# Patient Record
Sex: FEMALE | Race: WHITE | NOT HISPANIC OR LATINO | Employment: FULL TIME | URBAN - METROPOLITAN AREA
[De-identification: names, ages, dates, MRNs, and addresses within clinical notes are randomized per-mention and may not be internally consistent; named-entity substitution may affect disease eponyms.]

---

## 2023-05-19 ENCOUNTER — APPOINTMENT (OUTPATIENT)
Dept: LAB | Facility: LAB | Age: 33
End: 2023-05-19

## 2023-11-09 PROBLEM — H53.009 AMBLYOPIA: Status: ACTIVE | Noted: 2021-07-07

## 2023-11-09 PROBLEM — N94.6 DYSMENORRHEA: Status: ACTIVE | Noted: 2021-07-07

## 2023-11-09 PROBLEM — N94.3 PMS (PREMENSTRUAL SYNDROME): Status: ACTIVE | Noted: 2021-07-07

## 2023-11-09 PROBLEM — N60.19 FIBROCYSTIC BREAST: Status: ACTIVE | Noted: 2021-07-07

## 2023-11-09 RX ORDER — ECONAZOLE NITRATE 10 MG/G
1 CREAM TOPICAL DAILY
COMMUNITY
Start: 2015-01-19 | End: 2024-01-05 | Stop reason: ALTCHOICE

## 2023-11-09 RX ORDER — NORETHINDRONE ACETATE AND ETHINYL ESTRADIOL 1MG-20(21)
1 KIT ORAL DAILY
COMMUNITY
Start: 2020-10-22 | End: 2023-12-05

## 2023-11-09 RX ORDER — HYDROXYZINE HYDROCHLORIDE 25 MG/1
TABLET, FILM COATED ORAL
COMMUNITY
Start: 2019-05-11 | End: 2024-01-05 | Stop reason: ALTCHOICE

## 2023-11-09 RX ORDER — ESCITALOPRAM OXALATE 10 MG/1
TABLET ORAL
COMMUNITY
Start: 2019-05-11 | End: 2024-01-05 | Stop reason: ALTCHOICE

## 2023-11-10 ENCOUNTER — LAB (OUTPATIENT)
Dept: LAB | Facility: LAB | Age: 33
End: 2023-11-10
Payer: COMMERCIAL

## 2023-11-10 ENCOUNTER — INITIAL PRENATAL (OUTPATIENT)
Dept: OBSTETRICS AND GYNECOLOGY | Facility: CLINIC | Age: 33
End: 2023-11-10
Payer: COMMERCIAL

## 2023-11-10 VITALS — SYSTOLIC BLOOD PRESSURE: 113 MMHG | DIASTOLIC BLOOD PRESSURE: 72 MMHG | WEIGHT: 150 LBS

## 2023-11-10 DIAGNOSIS — Z34.91 PRENATAL CARE IN FIRST TRIMESTER (HHS-HCC): ICD-10-CM

## 2023-11-10 DIAGNOSIS — O21.9 NAUSEA AND VOMITING IN PREGNANCY PRIOR TO 22 WEEKS GESTATION (HHS-HCC): ICD-10-CM

## 2023-11-10 DIAGNOSIS — Z34.91 PRENATAL CARE IN FIRST TRIMESTER (HHS-HCC): Primary | ICD-10-CM

## 2023-11-10 DIAGNOSIS — Z23 NEED FOR IMMUNIZATION AGAINST INFLUENZA: ICD-10-CM

## 2023-11-10 DIAGNOSIS — R42 DIZZINESS: ICD-10-CM

## 2023-11-10 DIAGNOSIS — J45.20 MILD INTERMITTENT ASTHMA WITHOUT COMPLICATION (HHS-HCC): ICD-10-CM

## 2023-11-10 LAB
ABO GROUP (TYPE) IN BLOOD: NORMAL
ERYTHROCYTE [DISTWIDTH] IN BLOOD BY AUTOMATED COUNT: 10.8 % (ref 11.5–14.5)
HBV SURFACE AG SERPL QL IA: NONREACTIVE
HCT VFR BLD AUTO: 40.1 % (ref 36–46)
HCV AB SER QL: NONREACTIVE
HGB BLD-MCNC: 12.9 G/DL (ref 12–16)
HIV 1+2 AB+HIV1 P24 AG SERPL QL IA: NONREACTIVE
MCH RBC QN AUTO: 31.1 PG (ref 26–34)
MCHC RBC AUTO-ENTMCNC: 32.2 G/DL (ref 32–36)
MCV RBC AUTO: 97 FL (ref 80–100)
NRBC BLD-RTO: 0 /100 WBCS (ref 0–0)
PLATELET # BLD AUTO: 237 X10*3/UL (ref 150–450)
RBC # BLD AUTO: 4.15 X10*6/UL (ref 4–5.2)
RH FACTOR (ANTIGEN D): NORMAL
RUBV IGG SERPL IA-ACNC: 2.3 IA
RUBV IGG SERPL QL IA: POSITIVE
T PALLIDUM AB SER QL: NONREACTIVE
WBC # BLD AUTO: 8.3 X10*3/UL (ref 4.4–11.3)

## 2023-11-10 PROCEDURE — 83021 HEMOGLOBIN CHROMOTOGRAPHY: CPT

## 2023-11-10 PROCEDURE — 88175 CYTOPATH C/V AUTO FLUID REDO: CPT | Mod: TC | Performed by: STUDENT IN AN ORGANIZED HEALTH CARE EDUCATION/TRAINING PROGRAM

## 2023-11-10 PROCEDURE — 86317 IMMUNOASSAY INFECTIOUS AGENT: CPT

## 2023-11-10 PROCEDURE — 86803 HEPATITIS C AB TEST: CPT

## 2023-11-10 PROCEDURE — 86780 TREPONEMA PALLIDUM: CPT

## 2023-11-10 PROCEDURE — 90662 IIV NO PRSV INCREASED AG IM: CPT | Performed by: STUDENT IN AN ORGANIZED HEALTH CARE EDUCATION/TRAINING PROGRAM

## 2023-11-10 PROCEDURE — 83020 HEMOGLOBIN ELECTROPHORESIS: CPT

## 2023-11-10 PROCEDURE — 87086 URINE CULTURE/COLONY COUNT: CPT | Performed by: STUDENT IN AN ORGANIZED HEALTH CARE EDUCATION/TRAINING PROGRAM

## 2023-11-10 PROCEDURE — 87389 HIV-1 AG W/HIV-1&-2 AB AG IA: CPT

## 2023-11-10 PROCEDURE — 87661 TRICHOMONAS VAGINALIS AMPLIF: CPT | Mod: 59 | Performed by: STUDENT IN AN ORGANIZED HEALTH CARE EDUCATION/TRAINING PROGRAM

## 2023-11-10 PROCEDURE — 87340 HEPATITIS B SURFACE AG IA: CPT

## 2023-11-10 PROCEDURE — 0500F INITIAL PRENATAL CARE VISIT: CPT | Performed by: STUDENT IN AN ORGANIZED HEALTH CARE EDUCATION/TRAINING PROGRAM

## 2023-11-10 PROCEDURE — 86900 BLOOD TYPING SEROLOGIC ABO: CPT

## 2023-11-10 PROCEDURE — 87624 HPV HI-RISK TYP POOLED RSLT: CPT | Mod: 59 | Performed by: STUDENT IN AN ORGANIZED HEALTH CARE EDUCATION/TRAINING PROGRAM

## 2023-11-10 PROCEDURE — 36415 COLL VENOUS BLD VENIPUNCTURE: CPT

## 2023-11-10 PROCEDURE — 87800 DETECT AGNT MULT DNA DIREC: CPT | Performed by: STUDENT IN AN ORGANIZED HEALTH CARE EDUCATION/TRAINING PROGRAM

## 2023-11-10 PROCEDURE — 86901 BLOOD TYPING SEROLOGIC RH(D): CPT

## 2023-11-10 PROCEDURE — 88175 CYTOPATH C/V AUTO FLUID REDO: CPT | Mod: TC,GCY | Performed by: STUDENT IN AN ORGANIZED HEALTH CARE EDUCATION/TRAINING PROGRAM

## 2023-11-10 PROCEDURE — 85027 COMPLETE CBC AUTOMATED: CPT

## 2023-11-10 RX ORDER — ALBUTEROL SULFATE 90 UG/1
2 AEROSOL, METERED RESPIRATORY (INHALATION) EVERY 6 HOURS PRN
Qty: 18 G | Refills: 11 | Status: SHIPPED | OUTPATIENT
Start: 2023-11-10 | End: 2024-02-14 | Stop reason: HOSPADM

## 2023-11-10 RX ORDER — DOXYLAMINE SUCCINATE AND PYRIDOXINE HYDROCHLORIDE, DELAYED RELEASE TABLETS 10 MG/10 MG 10; 10 MG/1; MG/1
1 TABLET, DELAYED RELEASE ORAL DAILY
Qty: 90 TABLET | Refills: 1 | Status: SHIPPED | OUTPATIENT
Start: 2023-11-10 | End: 2024-01-05 | Stop reason: ALTCHOICE

## 2023-11-10 RX ORDER — MECLIZINE HYDROCHLORIDE 25 MG/1
25 TABLET ORAL 3 TIMES DAILY PRN
Qty: 30 TABLET | Refills: 0 | Status: SHIPPED | OUTPATIENT
Start: 2023-11-10 | End: 2023-11-20

## 2023-11-10 RX ORDER — PROMETHAZINE HYDROCHLORIDE 12.5 MG/1
12.5 TABLET ORAL EVERY 4 HOURS
Qty: 30 TABLET | Refills: 1 | Status: SHIPPED | OUTPATIENT
Start: 2023-11-10 | End: 2024-01-05 | Stop reason: ALTCHOICE

## 2023-11-10 ASSESSMENT — ENCOUNTER SYMPTOMS
NEUROLOGICAL NEGATIVE: 0
RESPIRATORY NEGATIVE: 0
ALLERGIC/IMMUNOLOGIC NEGATIVE: 0
CONSTITUTIONAL NEGATIVE: 0
CARDIOVASCULAR NEGATIVE: 0
ENDOCRINE NEGATIVE: 0
HEMATOLOGIC/LYMPHATIC NEGATIVE: 0
GASTROINTESTINAL NEGATIVE: 0
PSYCHIATRIC NEGATIVE: 0
MUSCULOSKELETAL NEGATIVE: 0
EYES NEGATIVE: 0

## 2023-11-10 NOTE — PROGRESS NOTES
Subjective   Patient ID [id number]   Lavonne Key is a 33 y.o.  at 8w6d with a working estimated date of delivery of 6/15/2024, by Last Menstrual Period who presents for an initial prenatal visit. This pregnancy is planned.    Presents with  Michael today. Excited for baby.    OB History    Para Term  AB Living   1             SAB IAB Ectopic Multiple Live Births                  # Outcome Date GA Lbr Jasson/2nd Weight Sex Delivery Anes PTL Lv   1 Current                 Objective   Physical Exam  Weight: 68 kg (150 lb)  Expected Total Weight Gain: Could not be calculated   Pregravid BMI: Could not be calculated  BP: 113/72 (Pt heart rate 75)          Physical Exam  Constitutional:       General: She is not in acute distress.     Appearance: Normal appearance.   Genitourinary:      Genitourinary Comments: NEFG. Vaginal mucosa normal appearing without lesions. Cervix nulliparous without lesions. Uterus anteverted, enlarged to 8w size. Adnexa without tenderness or masses.   HENT:      Head: Normocephalic.   Eyes:      General: No scleral icterus.     Extraocular Movements: Extraocular movements intact.   Cardiovascular:      Rate and Rhythm: Normal rate and regular rhythm.   Pulmonary:      Effort: Pulmonary effort is normal. No respiratory distress.   Abdominal:      Palpations: Abdomen is soft.   Musculoskeletal:         General: Normal range of motion.   Neurological:      General: No focal deficit present.      Mental Status: She is alert and oriented to person, place, and time.   Skin:     General: Skin is warm and dry.   Psychiatric:         Mood and Affect: Mood normal.         Behavior: Behavior normal.         Thought Content: Thought content normal.         Judgment: Judgment normal.   Vitals reviewed.         Assessment/Plan   Problem List Items Addressed This Visit             ICD-10-CM       Pregnancy    Prenatal care in first trimester - Primary Z34.91     - Labs: Ordered today: CBC,  T&S, RPR, HCV, HBsAg, HIV, GC/CT, Pap smear  - Genetic Screening: options discussed and patient desires to proceed with cffDNA. Kit provided toay.  - Ultrasound Surveillance: Viability not yet confirmed. NT scheduled today. Anatomy ultrasound at 16-20 weeks.   - Substance Use: denies  - Mood Screen: denies.  - IPV Screen: denies.    - Weight Gain: There is no height or weight on file to calculate BMI. Total weight gain of 25-35 recommended. Exercise recommendations reviewed.   - Dental Care: No current complaints. Encouraged regular dental care.  - Nutrition: Prenatal vitamin Rx provided. Encouraged consumption of low-mercury fish, avoidance of raw/undercooked fish. Encouraged avoidance of un-heated luncheon meats, hot dogs, unpasteurized cheeses and milks, kaur or meat spreads, smoked seafood, raw/undercooked meats and eggs, unwashed fruits and vegetables.            Relevant Medications    prenatal vitamin, iron-folic, (prenatal vit no.130-iron-folic) 27 mg iron-800 mcg folic acid tablet    Other Relevant Orders    US MAC OB imaging order    ABO/Rh    CBC Anemia Panel With Reflex,Pregnancy    HEMOGLOBIN IDENTIFICATION WITH PATH REVIEW    Hepatitis B surface antigen    Hepatitis C antibody    Rubella Antibody, IgG    Syphilis Screen with Reflex    HIV 1/2 Antigen/Antibody Screen with Reflex to Confirmation    THINPREP PAP TEST    [facility] Prenatal Screen    Nausea and vomiting in pregnancy prior to 22 weeks gestation O21.9     Currently symptomatic. Discussed B6/doxylamine for prevention. Encouraged patient to notify providers if symptoms become bothersome.         Relevant Medications    doxylamine-pyridoxine, vit B6, 10-10 mg tablet,delayed release (DR/EC)    promethazine (Phenergan) 12.5 mg tablet       Other    Dizziness R42     Discussed hydration, electrolytes, compression socks, checking BP  Rx for meclizine PRN          Relevant Medications    meclizine (Antivert) 25 mg tablet    Mild intermittent  asthma without complication J45.20     PRN albuterol ordered         Relevant Medications    albuterol 90 mcg/actuation inhaler    Need for immunization against influenza Z23     Flu given today            Follow up in 4 weeks for return OB visit.

## 2023-11-10 NOTE — ASSESSMENT & PLAN NOTE
- Labs: Ordered today: CBC, T&S, RPR, HCV, HBsAg, HIV, GC/CT, Pap smear  - Genetic Screening: options discussed and patient desires to proceed with cffDNA. Kit provided toay.  - Ultrasound Surveillance: Viability not yet confirmed. NT scheduled today. Anatomy ultrasound at 16-20 weeks.   - Substance Use: denies  - Mood Screen: denies.  - IPV Screen: denies.    - Weight Gain: There is no height or weight on file to calculate BMI. Total weight gain of 25-35 recommended. Exercise recommendations reviewed.   - Dental Care: No current complaints. Encouraged regular dental care.  - Nutrition: Prenatal vitamin Rx provided. Encouraged consumption of low-mercury fish, avoidance of raw/undercooked fish. Encouraged avoidance of un-heated luncheon meats, hot dogs, unpasteurized cheeses and milks, kaur or meat spreads, smoked seafood, raw/undercooked meats and eggs, unwashed fruits and vegetables.

## 2023-11-10 NOTE — ASSESSMENT & PLAN NOTE
Currently symptomatic. Discussed B6/doxylamine for prevention. Encouraged patient to notify providers if symptoms become bothersome.

## 2023-11-11 LAB — REFLEX ADDED, ANEMIA PANEL: NORMAL

## 2023-11-12 LAB — BACTERIA UR CULT: NORMAL

## 2023-11-14 LAB
C TRACH RRNA SPEC QL NAA+PROBE: NEGATIVE
HEMOGLOBIN A2: 3 % (ref 2–3.5)
HEMOGLOBIN A: 96.6 % (ref 95.8–98)
HEMOGLOBIN F: 0.4 % (ref 0–2)
HEMOGLOBIN IDENTIFICATION INTERPRETATION: NORMAL
N GONORRHOEA DNA SPEC QL PROBE+SIG AMP: NEGATIVE
PATH REVIEW-HGB IDENTIFICATION: NORMAL
T VAGINALIS RRNA SPEC QL NAA+PROBE: NEGATIVE

## 2023-12-05 ENCOUNTER — ANCILLARY PROCEDURE (OUTPATIENT)
Dept: RADIOLOGY | Facility: CLINIC | Age: 33
End: 2023-12-05
Payer: COMMERCIAL

## 2023-12-05 ENCOUNTER — ROUTINE PRENATAL (OUTPATIENT)
Dept: OBSTETRICS AND GYNECOLOGY | Facility: CLINIC | Age: 33
End: 2023-12-05
Payer: COMMERCIAL

## 2023-12-05 VITALS — WEIGHT: 152.3 LBS | DIASTOLIC BLOOD PRESSURE: 57 MMHG | SYSTOLIC BLOOD PRESSURE: 112 MMHG

## 2023-12-05 DIAGNOSIS — Z34.91 PRENATAL CARE IN FIRST TRIMESTER (HHS-HCC): ICD-10-CM

## 2023-12-05 DIAGNOSIS — O21.9 NAUSEA AND VOMITING IN PREGNANCY PRIOR TO 22 WEEKS GESTATION (HHS-HCC): ICD-10-CM

## 2023-12-05 PROCEDURE — 0501F PRENATAL FLOW SHEET: CPT

## 2023-12-05 PROCEDURE — 76813 OB US NUCHAL MEAS 1 GEST: CPT | Performed by: OBSTETRICS & GYNECOLOGY

## 2023-12-05 PROCEDURE — 76813 OB US NUCHAL MEAS 1 GEST: CPT

## 2023-12-05 ASSESSMENT — ENCOUNTER SYMPTOMS
ENDOCRINE NEGATIVE: 0
CARDIOVASCULAR NEGATIVE: 0
MUSCULOSKELETAL NEGATIVE: 0
PSYCHIATRIC NEGATIVE: 0
NEUROLOGICAL NEGATIVE: 0
HEMATOLOGIC/LYMPHATIC NEGATIVE: 0
RESPIRATORY NEGATIVE: 0
GASTROINTESTINAL NEGATIVE: 0
EYES NEGATIVE: 0
CONSTITUTIONAL NEGATIVE: 0
ALLERGIC/IMMUNOLOGIC NEGATIVE: 0

## 2023-12-05 NOTE — PROGRESS NOTES
Ob Follow-up  23     SUBJECTIVE      HPI: Lavonne Key is a 33 y.o.  at 12w3d here for RPNV.  She has no cramping no bleeding.  Patient reports some dizziness when she stands up. Having headaches for the past week. Some light sensitivity. Has not tried anything    OBJECTIVE  Visit Vitals  /57   Wt 69.1 kg (152 lb 4.8 oz)   LMP 2023   OB Status Pregnant   Smoking Status Former            ASSESSMENT & PLAN    Lavonne Key is a 33 y.o.  at 12w3d here for the following concerns we addressed today:    Problem List Items Addressed This Visit          Pregnancy    Nausea and vomiting in pregnancy prior to 22 weeks gestation    Current Assessment & Plan     Improving.         Prenatal care in first trimester    Overview     [] Initial BMI:   [x] Dating: LMP 23  [x] Prenatal Labs: ordered  [] Genetic Screening:  cffDNA, kit given; NT done   [x] Baby ASA: not indicated  [] Anatomy US:  [] 1hr GCT at 24-28wks:  [] Tdap (27-36wks):  [x] Flu Shot: 11/10/23  [] COVID vaccine: declined  [] Rhogam (if Rh neg):   [] GBS at 36 wks:  [x] Feeding: breast  [] Postpartum Birth control method: undecided  [] 39 weeks discussion of IOL vs. Expectant management:  [x] Mode of delivery:  anticipate          Current Assessment & Plan     NT done today - wnl. Up to date.  Pt has not completed cfDNA yet but has req and kit at home - will complete when she is able    Interested in education only centering - info sent and will be contacted accordingly    Discussed tylenol for headaches and is safe in pregnancy as well as adequate hydration.               No orders of the defined types were placed in this encounter.       RTC in 4 weeks  Pt seen and discussed with Dr. Dixon Morales MD

## 2023-12-05 NOTE — LETTER
12/5/2023    To Whom It May Concern:    Lavonne Key is being followed for her pregnancy at [facility].  Estimated Date of Delivery: 6/15/24    Sincerely,        Mariluz Morales MD  [facility]

## 2023-12-05 NOTE — ASSESSMENT & PLAN NOTE
NT done today - wnl. Up to date.  Pt has not completed cfDNA yet but has req and kit at home - will complete when she is able    Interested in education only centering - info sent and will be contacted accordingly    Discussed tylenol for headaches and is safe in pregnancy as well as adequate hydration.

## 2023-12-06 LAB
CYTOLOGY CMNT CVX/VAG CYTO-IMP: NORMAL
HPV HR 12 DNA GENITAL QL NAA+PROBE: NEGATIVE
HPV HR GENOTYPES PNL CVX NAA+PROBE: NEGATIVE
HPV16 DNA SPEC QL NAA+PROBE: NEGATIVE
HPV18 DNA SPEC QL NAA+PROBE: NEGATIVE
LAB AP HPV GENOTYPE QUESTION: YES
LAB AP HPV HR: NORMAL
LAB AP PAP ADDITIONAL TESTS: NORMAL
LABORATORY COMMENT REPORT: NORMAL
PATH REPORT.TOTAL CANCER: NORMAL

## 2023-12-14 ENCOUNTER — DOCUMENTATION (OUTPATIENT)
Dept: OPERATING ROOM | Facility: HOSPITAL | Age: 33
End: 2023-12-14
Payer: COMMERCIAL

## 2023-12-14 NOTE — PROGRESS NOTES
Pt called answering service. She reports that she gets staph infections in her nose often and she was wanting to know if Mupirocin ointment she has used previously. Reassured Mupirocin is a pregnancy category B drug.

## 2024-01-05 ENCOUNTER — ROUTINE PRENATAL (OUTPATIENT)
Dept: OBSTETRICS AND GYNECOLOGY | Facility: CLINIC | Age: 34
End: 2024-01-05
Payer: COMMERCIAL

## 2024-01-05 ENCOUNTER — LAB (OUTPATIENT)
Dept: LAB | Facility: LAB | Age: 34
End: 2024-01-05
Payer: COMMERCIAL

## 2024-01-05 VITALS — SYSTOLIC BLOOD PRESSURE: 112 MMHG | WEIGHT: 157 LBS | DIASTOLIC BLOOD PRESSURE: 69 MMHG

## 2024-01-05 DIAGNOSIS — Z34.91 PRENATAL CARE IN FIRST TRIMESTER (HHS-HCC): Primary | ICD-10-CM

## 2024-01-05 DIAGNOSIS — Z34.91 PRENATAL CARE IN FIRST TRIMESTER (HHS-HCC): ICD-10-CM

## 2024-01-05 PROCEDURE — 0501F PRENATAL FLOW SHEET: CPT

## 2024-01-05 PROCEDURE — 36415 COLL VENOUS BLD VENIPUNCTURE: CPT

## 2024-01-05 RX ORDER — MUPIROCIN CALCIUM 20 MG/G
CREAM TOPICAL 3 TIMES DAILY
COMMUNITY
End: 2024-03-05 | Stop reason: SDUPTHER

## 2024-01-05 NOTE — PROGRESS NOTES
"Subjective   Patient ID [id number]   Lavonne Key is a 33 y.o.  at 16w6d with a working estimated date of delivery of 6/15/2024, by Last Menstrual Period who presents for a routine prenatal visit. She denies vaginal bleeding, leakage of fluid, decreased fetal movements, or contractions.    Pt feels 8/10 intermittent RLQ pain after sneezing  or pressure is applied to abodmen, that spontaneously resolves . She also notes diffuse abdomen cramping after working out and at night . Pt Deadlifts 5 days a week. Denies VB, LOF.     Separately, patient previously endorses feeling dizzy. Dizziness has improved since she takes more frequent breaks at work and doesn't stand for prolonged periods of time     Objective   Physical Exam  Weight: 71.2 kg (157 lb)  Expected Total Weight Gain: Could not be calculated   Pregravid BMI: Could not be calculated  BP: 112/69 (HR 69)  Fetal Heart Rate: 150      Prenatal Labs  Urine dip:  No results found for: \"KETONESU\", \"GLUCOSEUR\", \"LEUKOCYTESUR\"    Lab Results   Component Value Date    HGB 12.9 11/10/2023    HCT 40.1 11/10/2023    ABO O 11/10/2023    HEPBSAG Nonreactive 11/10/2023     No results found for: \"PAPPA\", \"AFP\", \"HCG\", \"ESTRIOL\", \"INHBA\"  No results found for: \"GLUF\", \"GLUT1\", \"ULQBRFF3FR\", \"PQCNGMU3LV\"    Imaging  The most recent ultrasound was performed on The most recent ultrasound study is not finalized with a study GA of The most recent ultrasound study is not finalized and EFW of The most recent ultrasound study is not finalized.  The most recent ultrasound study is not finalized  The most recent ultrasound study is not finalized    Assessment/Plan   .  Problem List Items Addressed This Visit       Prenatal care in first trimester - Primary    Overview     [] Initial BMI:   [x] Dating: LMP 23  [x] Prenatal Labs: ordered  [] Genetic Screening:  cffDNA, kit given; NT done   [x] Baby ASA: not indicated  [] Anatomy US: ordered   [] 1hr GCT at 24-28wks:  [] Tdap " (27-36wks):  [x] Flu Shot: 11/10/23  [] COVID vaccine: declined  [] Rhogam (if Rh neg):   [] GBS at 36 wks:  [x] Feeding: breast  [] Postpartum Birth control method: undecided  [] 39 weeks discussion of IOL vs. Expectant management:  [x] Mode of delivery:  anticipate          Current Assessment & Plan     -patient provided a new cfDNA box today   - MSK pain in pregnancy, recommend continue exercise that is within her normal range and tolerance. PTL precautions were provided   - Dizziness, likely in s/o prolonged standing. Recommend compression socks, frequent breaks and adequate hydration          Relevant Orders    [facility] Prenatal Screen     Follow up in 4 weeks for a routine prenatal visit.    Seen and d/w Dr. Winchester,  Georgie Geiger MD , PGY-2

## 2024-01-06 NOTE — PROGRESS NOTES
I saw and evaluated the patient. I personally obtained the key and critical portions of the history and physical exam or was physically present for key and critical portions performed by the resident/fellow. I reviewed the resident/fellow's documentation and discussed the patient with the resident/fellow. I agree with the resident/fellow's medical decision making as documented in the note.    Anamika Winchester MD

## 2024-01-06 NOTE — ASSESSMENT & PLAN NOTE
-patient provided a new cfDNA box today   - MSK pain in pregnancy, recommend continue exercise that is within her normal range and tolerance. PTL precautions were provided   - Dizziness, likely in s/o prolonged standing. Recommend compression socks, frequent breaks and adequate hydration

## 2024-01-24 ENCOUNTER — HOSPITAL ENCOUNTER (OUTPATIENT)
Dept: RADIOLOGY | Facility: HOSPITAL | Age: 34
Discharge: HOME | End: 2024-01-24
Payer: COMMERCIAL

## 2024-01-24 DIAGNOSIS — Z34.90 PREGNANT (HHS-HCC): ICD-10-CM

## 2024-01-24 PROCEDURE — 76811 OB US DETAILED SNGL FETUS: CPT

## 2024-01-24 PROCEDURE — 76805 OB US >/= 14 WKS SNGL FETUS: CPT | Performed by: OBSTETRICS & GYNECOLOGY

## 2024-01-30 ENCOUNTER — ROUTINE PRENATAL (OUTPATIENT)
Dept: OBSTETRICS AND GYNECOLOGY | Facility: HOSPITAL | Age: 34
End: 2024-01-30
Payer: COMMERCIAL

## 2024-01-30 DIAGNOSIS — Z34.92 PRENATAL CARE IN SECOND TRIMESTER (HHS-HCC): Primary | ICD-10-CM

## 2024-01-30 DIAGNOSIS — Z3A.20 20 WEEKS GESTATION OF PREGNANCY (HHS-HCC): ICD-10-CM

## 2024-01-30 PROCEDURE — 0501F PRENATAL FLOW SHEET: CPT | Performed by: STUDENT IN AN ORGANIZED HEALTH CARE EDUCATION/TRAINING PROGRAM

## 2024-01-30 ASSESSMENT — ENCOUNTER SYMPTOMS
DEPRESSION: 0
LOSS OF SENSATION IN FEET: 0
OCCASIONAL FEELINGS OF UNSTEADINESS: 0

## 2024-01-30 NOTE — PROGRESS NOTES
Subjective   Patient ID [id number]   Lavonne Booth is a 33 y.o.  at 20w3d with a working estimated date of delivery of 6/15/2024, by Last Menstrual Period who presents for a routine prenatal visit. She denies vaginal bleeding, leakage of fluid, decreased fetal movements, or contractions.    Her pregnancy is complicated by:  Asthma, mild intermittent  Nausea and vomiting, resolved    Objective   Physical Exam  Weight: 73 kg (161 lb)  Expected Total Weight Gain: 11.5 kg (25 lb)-16 kg (35 lb)   Pregravid BMI: 23.49  BP: 120/71  Fetal Heart Rate: 130 Fundal Height (cm): 19 cm    US OB detail fetal anatomy 2024    Narrative  Interpreted by: Roque Gamble  Indication  ========  Fetal Abnormality Suspected  History  ======  General History  Smoking: no  Height 170 cm  Height (ft) 5 ft  Height (in) 7 in  Previous Outcomes   1  Para 0  Pregnancy  =========  Resendez pregnancy. Number of fetuses: 1  Dating  ======  LMP on: 2023  Cycle: irregular cycle  GA by LMP 19 w + 4 d  MICHEL by LMP: 6/15/2024  Conception: LMP  Ultrasound examination on: 2024  GA by U/S based upon: AC, BPD, Femur, HC  GA by U/S 19 w + 3 d  MICHEL by U/S: 2024  Assigned: based on ultrasound (CRL), selected on 2023  Assigned GA 19 w + 3 d  Assigned MICHEL: 2024  Fetal Growth Overview  =================  Exam date        GA              BPD (mm)          HC (mm)              AC (mm)               FL (mm)          HL (mm)             EFW (g)  2024        19w 3d        45.6     66%        166.9    39%        138.4     39%        30    37%        28.5     24%        284    36%  Impression  =========  rr cfDNA  - Fetal biometry is consistent with the stated gestational age  - Normal anatomic survey  - Normal appearing adnexa    A normal ultrasound exam cannot exclude all structural or functional defects    Thank you for allowing us to participate in the care of your patient  Follow-up  ========  If clinically  indicated    Assessment/Plan   Problem List Items Addressed This Visit       Prenatal care in second trimester - Primary    Overview     [x] Initial BMI: 23  [x] Dating: LMP 23  [x] Prenatal Labs: ordered  [x] Genetic Screening:  risk-reducing cfDNA; NT done   [x] Baby ASA: not indicated  [x] Anatomy US: unremarkable  [] 1hr GCT at 24-28wks:  [] Tdap (27-36wks):  [x] Flu Shot: 11/10/23  [] COVID vaccine: declined  [] GBS at 36 wks:  [x] Feeding: breast  [] Postpartum Birth control method: undecided  [] 39 weeks discussion of IOL vs. Expectant management:  [x] Mode of delivery:  anticipate           Other Visit Diagnoses       20 weeks gestation of pregnancy            Reviewed next labs to be obtained 24-28 wga including OGT.  Follow up in 4 weeks for a routine prenatal visit.    Elli Villar MD

## 2024-01-31 VITALS
BODY MASS INDEX: 25.27 KG/M2 | HEIGHT: 67 IN | DIASTOLIC BLOOD PRESSURE: 71 MMHG | SYSTOLIC BLOOD PRESSURE: 120 MMHG | WEIGHT: 161 LBS

## 2024-01-31 PROBLEM — Z34.92 PRENATAL CARE IN SECOND TRIMESTER (HHS-HCC): Status: ACTIVE | Noted: 2023-11-10

## 2024-02-14 ENCOUNTER — HOSPITAL ENCOUNTER (OUTPATIENT)
Facility: HOSPITAL | Age: 34
Discharge: HOME | End: 2024-02-14
Attending: STUDENT IN AN ORGANIZED HEALTH CARE EDUCATION/TRAINING PROGRAM | Admitting: OBSTETRICS & GYNECOLOGY
Payer: COMMERCIAL

## 2024-02-14 ENCOUNTER — HOSPITAL ENCOUNTER (OUTPATIENT)
Facility: HOSPITAL | Age: 34
End: 2024-02-14
Attending: OBSTETRICS & GYNECOLOGY | Admitting: OBSTETRICS & GYNECOLOGY
Payer: COMMERCIAL

## 2024-02-14 ENCOUNTER — TELEPHONE (OUTPATIENT)
Dept: OBSTETRICS AND GYNECOLOGY | Facility: HOSPITAL | Age: 34
End: 2024-02-14
Payer: COMMERCIAL

## 2024-02-14 VITALS
TEMPERATURE: 97.2 F | HEART RATE: 75 BPM | DIASTOLIC BLOOD PRESSURE: 61 MMHG | BODY MASS INDEX: 26.02 KG/M2 | WEIGHT: 165.79 LBS | RESPIRATION RATE: 16 BRPM | HEIGHT: 67 IN | SYSTOLIC BLOOD PRESSURE: 113 MMHG

## 2024-02-14 DIAGNOSIS — Z3A.22 22 WEEKS GESTATION OF PREGNANCY (HHS-HCC): Primary | ICD-10-CM

## 2024-02-14 DIAGNOSIS — M94.0 COSTOCHONDRITIS: ICD-10-CM

## 2024-02-14 PROCEDURE — 99214 OFFICE O/P EST MOD 30 MIN: CPT | Mod: 25,27

## 2024-02-14 PROCEDURE — 99212 OFFICE O/P EST SF 10 MIN: CPT

## 2024-02-14 RX ORDER — CYCLOBENZAPRINE HCL 10 MG
10 TABLET ORAL 3 TIMES DAILY PRN
Qty: 20 TABLET | Refills: 0 | Status: SHIPPED | OUTPATIENT
Start: 2024-02-14 | End: 2024-04-23 | Stop reason: SDUPTHER

## 2024-02-14 SDOH — SOCIAL STABILITY: SOCIAL INSECURITY: ARE YOU OR HAVE YOU BEEN THREATENED OR ABUSED PHYSICALLY, EMOTIONALLY, OR SEXUALLY BY ANYONE?: NO

## 2024-02-14 SDOH — HEALTH STABILITY: MENTAL HEALTH: SUICIDAL BEHAVIOR (LIFETIME): NO

## 2024-02-14 SDOH — SOCIAL STABILITY: SOCIAL INSECURITY: VERBAL ABUSE: DENIES

## 2024-02-14 SDOH — HEALTH STABILITY: MENTAL HEALTH: NON-SPECIFIC ACTIVE SUICIDAL THOUGHTS (PAST 1 MONTH): NO

## 2024-02-14 SDOH — SOCIAL STABILITY: SOCIAL INSECURITY: ARE THERE ANY APPARENT SIGNS OF INJURIES/BEHAVIORS THAT COULD BE RELATED TO ABUSE/NEGLECT?: NO

## 2024-02-14 SDOH — HEALTH STABILITY: MENTAL HEALTH: WISH TO BE DEAD (PAST 1 MONTH): NO

## 2024-02-14 SDOH — SOCIAL STABILITY: SOCIAL INSECURITY: PHYSICAL ABUSE: DENIES

## 2024-02-14 SDOH — HEALTH STABILITY: MENTAL HEALTH: WERE YOU ABLE TO COMPLETE ALL THE BEHAVIORAL HEALTH SCREENINGS?: YES

## 2024-02-14 SDOH — SOCIAL STABILITY: SOCIAL INSECURITY: ABUSE SCREEN: ADULT

## 2024-02-14 SDOH — SOCIAL STABILITY: SOCIAL INSECURITY: DOES ANYONE TRY TO KEEP YOU FROM HAVING/CONTACTING OTHER FRIENDS OR DOING THINGS OUTSIDE YOUR HOME?: NO

## 2024-02-14 SDOH — HEALTH STABILITY: MENTAL HEALTH: HAVE YOU USED ANY PRESCRIPTION DRUGS OTHER THAN PRESCRIBED IN THE PAST 12 MONTHS?: NO

## 2024-02-14 SDOH — HEALTH STABILITY: MENTAL HEALTH: HAVE YOU USED ANY SUBSTANCES (CANABIS, COCAINE, HEROIN, HALLUCINOGENS, INHALANTS, ETC.) IN THE PAST 12 MONTHS?: NO

## 2024-02-14 SDOH — SOCIAL STABILITY: SOCIAL INSECURITY: HAS ANYONE EVER THREATENED TO HURT YOUR FAMILY OR YOUR PETS?: NO

## 2024-02-14 SDOH — SOCIAL STABILITY: SOCIAL INSECURITY: HAVE YOU HAD THOUGHTS OF HARMING ANYONE ELSE?: NO

## 2024-02-14 SDOH — SOCIAL STABILITY: SOCIAL INSECURITY: DO YOU FEEL ANYONE HAS EXPLOITED OR TAKEN ADVANTAGE OF YOU FINANCIALLY OR OF YOUR PERSONAL PROPERTY?: NO

## 2024-02-14 SDOH — ECONOMIC STABILITY: HOUSING INSECURITY: DO YOU FEEL UNSAFE GOING BACK TO THE PLACE WHERE YOU ARE LIVING?: NO

## 2024-02-14 ASSESSMENT — LIFESTYLE VARIABLES
SKIP TO QUESTIONS 9-10: 1
AUDIT-C TOTAL SCORE: 0
AUDIT-C TOTAL SCORE: 0
HOW OFTEN DO YOU HAVE A DRINK CONTAINING ALCOHOL: NEVER
HOW OFTEN DO YOU HAVE 6 OR MORE DRINKS ON ONE OCCASION: NEVER
HOW MANY STANDARD DRINKS CONTAINING ALCOHOL DO YOU HAVE ON A TYPICAL DAY: PATIENT DOES NOT DRINK

## 2024-02-14 ASSESSMENT — PAIN SCALES - GENERAL: PAINLEVEL_OUTOF10: 4

## 2024-02-14 ASSESSMENT — PATIENT HEALTH QUESTIONNAIRE - PHQ9
2. FEELING DOWN, DEPRESSED OR HOPELESS: NOT AT ALL
1. LITTLE INTEREST OR PLEASURE IN DOING THINGS: NOT AT ALL
SUM OF ALL RESPONSES TO PHQ9 QUESTIONS 1 & 2: 0

## 2024-02-14 NOTE — H&P
Obstetrical Admission History and Physical     Lavonne Booth is a 33 y.o.  at 22.4 wga with LUQ numbness and abdominal pain.    Chief Complaint: Numbness    Assessment/Plan      Costochondritis  - SVE 0/0/-3  - PE negative for abdominal pain with palpation  - Likely MSK pain 2/2 costochondritis, low c/f PTL or splenomegaly  - Offered tylenol and flexeril, patient declines as she is going back to work  - Flexeril sent to pharmacy  - Return precautions reviewed, patient verbalizes understanding     IUP at 22.4 wga  -  by doppler  - Decreased fetal movement  - Continue routine prenatal care  - Next appt 3/5     Maternal Well-being  - Vital signs stable and WNL  - All questions and concerns addressed     Dispo  - patient appropriate for d/c home, agrees with plan  - f/u at next scheduled OB appt or to triage sooner as needed     Discussed plan and reviewed tracing with COSTA Guerrero-CNP      Active Problems:    Costochondritis    22 weeks gestation of pregnancy      Pregnancy Problems (from 11/10/23 to present)       Problem Noted Resolved    22 weeks gestation of pregnancy 2024 by COSTA Oliveira-CNP No    Priority:  Medium      Prenatal care in second trimester 11/10/2023 by Jose Elias Mendoza MD No    Priority:  Medium      Overview Addendum 2024 11:22 PM by Elli Villar MD     [x] Initial BMI: 23  [x] Dating: LMP 23  [x] Prenatal Labs: ordered  [x] Genetic Screening:  risk-reducing cfDNA; NT done   [x] Baby ASA: not indicated  [x] Anatomy US: unremarkable  [] 1hr GCT at 24-28wks:  [] Tdap (27-36wks):  [x] Flu Shot: 11/10/23  [] COVID vaccine: declined  [] GBS at 36 wks:  [x] Feeding: breast  [] Postpartum Birth control method: undecided  [] 39 weeks discussion of IOL vs. Expectant management:  [x] Mode of delivery:  anticipate          Nausea and vomiting in pregnancy prior to 22 weeks gestation 11/10/2023 by Jose Elias Mendoza MD No    Priority:   Medium            Subjective   Lavonne is here with LUQ pain. Decreased fetal movement. Denies vaginal bleeding., Denies contractions., Denies leaking of fluid.      Patient reports numbness in LUQ x 1 week, now with constant sharp pain x 2 days. She has trouble sleeping because of it. Denies symptoms of illness: no sore throat, malaise, fever/chills. Reports normal bowel movements, no constipation.     Obstetrical History   OB History    Para Term  AB Living   1             SAB IAB Ectopic Multiple Live Births                  # Outcome Date GA Lbr Jasson/2nd Weight Sex Delivery Anes PTL Lv   1 Current                Past Medical History  No past medical history on file.     Past Surgical History   Past Surgical History:   Procedure Laterality Date    EYE SURGERY         Social History  Social History     Tobacco Use    Smoking status: Former    Smokeless tobacco: Never    Tobacco comments:     Pt used vape before pregnancy    Substance Use Topics    Alcohol use: Not Currently     Comment: occasional     Substance and Sexual Activity   Drug Use Never       Allergies  Azithromycin     Medications  No medications prior to admission.       Objective    Last Vitals  Temp Pulse Resp BP MAP O2 Sat   36.2 °C (97.2 °F) (Simultaneous filing. User may not have seen previous data.) 75 16 113/61         Physical Examination  FHR is 142  by doppler  CERVIX: Closed cm dilated, 0 % effaced, -3 station; MEMBRANES are  intact  General: Examination reveals a well developed, well nourished, female, in no acute distress. She is alert and cooperative.  Lungs: symmetrical, non-labored breathing.  Cardiac: warm, well-perfused.  Abdomen: soft, non-tender, gravid.  Extremities: no redness or tenderness in the calves or thighs.  Neurological: alert, oriented, normal speech, no focal findings or movement disorder noted.     Lab Review  Labs in chart were reviewed.

## 2024-02-14 NOTE — TELEPHONE ENCOUNTER
Patient called office to report abdominal pain  Patient identified by name and   Patient is 22.4 wga  Patient states for 1 week she has been experiencing numbness/tingling and pain on her left upper abdomen  Patient states that the pain used to resolve with position changes but it is not going away now and is a sharp stabbing pain  She also notes that the area seems swollen and at times she feels nauseous due to the pain  Patient denies vaginal bleeding and loss of fluid  Patient endorses fetal movement but states it is more decreased than usual  Denies lower extremity swelling and vision changes  Endorses intermittent headaches that usually resolve on their own  Patient states she has not taken tylenol for any pain  She states she contacted the on call MD yesterday who instructed her to call office today for appointment  No appointment availability today  Instructed patient to present to [facility] L&D triage for evaluation to check on her and baby  Patient verbalized understanding  Encouraged patient to call office with any questions or concerns  Jennifer Sprague RN

## 2024-02-19 ENCOUNTER — TELEPHONE (OUTPATIENT)
Dept: OBSTETRICS AND GYNECOLOGY | Facility: HOSPITAL | Age: 34
End: 2024-02-19
Payer: COMMERCIAL

## 2024-02-19 NOTE — TELEPHONE ENCOUNTER
Called patient to follow up after triage visit on 2/14  Patient states she is feeling much better and was given Flexeril for muscle spasms and also saw a chiropractor  Patient scheduled for rpn on 3/5 with Dr. Villar  Encouraged patient to call office with any questions or concerns  Jennifer Sprague RN

## 2024-03-05 ENCOUNTER — ROUTINE PRENATAL (OUTPATIENT)
Dept: OBSTETRICS AND GYNECOLOGY | Facility: HOSPITAL | Age: 34
End: 2024-03-05
Payer: COMMERCIAL

## 2024-03-05 VITALS — WEIGHT: 167 LBS | SYSTOLIC BLOOD PRESSURE: 116 MMHG | DIASTOLIC BLOOD PRESSURE: 75 MMHG | BODY MASS INDEX: 26.16 KG/M2

## 2024-03-05 DIAGNOSIS — Z3A.25 25 WEEKS GESTATION OF PREGNANCY (HHS-HCC): ICD-10-CM

## 2024-03-05 DIAGNOSIS — Z34.92 PRENATAL CARE IN SECOND TRIMESTER (HHS-HCC): ICD-10-CM

## 2024-03-05 DIAGNOSIS — Z22.322 NASAL COLONIZATION WITH METHICILLIN-RESISTANT STAPHYLOCOCCUS AUREUS: Primary | ICD-10-CM

## 2024-03-05 PROCEDURE — 0501F PRENATAL FLOW SHEET: CPT | Performed by: STUDENT IN AN ORGANIZED HEALTH CARE EDUCATION/TRAINING PROGRAM

## 2024-03-05 RX ORDER — MUPIROCIN CALCIUM 20 MG/G
CREAM TOPICAL 3 TIMES DAILY
Qty: 30 G | Refills: 2 | Status: SHIPPED | OUTPATIENT
Start: 2024-03-05 | End: 2025-03-05

## 2024-03-06 ENCOUNTER — HOSPITAL ENCOUNTER (OUTPATIENT)
Facility: HOSPITAL | Age: 34
Discharge: HOME | End: 2024-03-06
Attending: STUDENT IN AN ORGANIZED HEALTH CARE EDUCATION/TRAINING PROGRAM | Admitting: OBSTETRICS & GYNECOLOGY
Payer: COMMERCIAL

## 2024-03-06 VITALS
OXYGEN SATURATION: 96 % | BODY MASS INDEX: 26.35 KG/M2 | HEART RATE: 80 BPM | TEMPERATURE: 98.4 F | WEIGHT: 168.21 LBS | RESPIRATION RATE: 16 BRPM | DIASTOLIC BLOOD PRESSURE: 65 MMHG | SYSTOLIC BLOOD PRESSURE: 108 MMHG

## 2024-03-06 PROBLEM — R03.0 ELEVATED BLOOD PRESSURE READING WITHOUT DIAGNOSIS OF HYPERTENSION: Status: ACTIVE | Noted: 2024-03-06

## 2024-03-06 LAB
ALBUMIN SERPL BCP-MCNC: 3.6 G/DL (ref 3.4–5)
ALP SERPL-CCNC: 38 U/L (ref 33–110)
ALT SERPL W P-5'-P-CCNC: 9 U/L (ref 7–45)
ANION GAP SERPL CALC-SCNC: 13 MMOL/L (ref 10–20)
AST SERPL W P-5'-P-CCNC: 16 U/L (ref 9–39)
BILIRUB SERPL-MCNC: 0.3 MG/DL (ref 0–1.2)
BUN SERPL-MCNC: 24 MG/DL (ref 6–23)
CALCIUM SERPL-MCNC: 8.7 MG/DL (ref 8.6–10.6)
CHLORIDE SERPL-SCNC: 106 MMOL/L (ref 98–107)
CO2 SERPL-SCNC: 22 MMOL/L (ref 21–32)
CREAT SERPL-MCNC: 0.69 MG/DL (ref 0.5–1.05)
CREAT UR-MCNC: 44.7 MG/DL (ref 20–320)
EGFRCR SERPLBLD CKD-EPI 2021: >90 ML/MIN/1.73M*2
ERYTHROCYTE [DISTWIDTH] IN BLOOD BY AUTOMATED COUNT: 11.7 % (ref 11.5–14.5)
GLUCOSE 1H P 50 G GLC PO SERPL-MCNC: 115 MG/DL
GLUCOSE BLD MANUAL STRIP-MCNC: 82 MG/DL (ref 74–99)
GLUCOSE SERPL-MCNC: 74 MG/DL (ref 74–99)
HCT VFR BLD AUTO: 33.8 % (ref 36–46)
HGB BLD-MCNC: 11.6 G/DL (ref 12–16)
MCH RBC QN AUTO: 31.8 PG (ref 26–34)
MCHC RBC AUTO-ENTMCNC: 34.3 G/DL (ref 32–36)
MCV RBC AUTO: 93 FL (ref 80–100)
NRBC BLD-RTO: 0 /100 WBCS (ref 0–0)
PLATELET # BLD AUTO: 215 X10*3/UL (ref 150–450)
POTASSIUM SERPL-SCNC: 4.1 MMOL/L (ref 3.5–5.3)
PROT SERPL-MCNC: 6.1 G/DL (ref 6.4–8.2)
PROT UR-ACNC: <4 MG/DL (ref 5–24)
PROT/CREAT UR: ABNORMAL MG/G{CREAT}
RBC # BLD AUTO: 3.65 X10*6/UL (ref 4–5.2)
SODIUM SERPL-SCNC: 137 MMOL/L (ref 136–145)
TREPONEMA PALLIDUM IGG+IGM AB [PRESENCE] IN SERUM OR PLASMA BY IMMUNOASSAY: NONREACTIVE
WBC # BLD AUTO: 8.9 X10*3/UL (ref 4.4–11.3)

## 2024-03-06 PROCEDURE — 82570 ASSAY OF URINE CREATININE: CPT

## 2024-03-06 PROCEDURE — 36415 COLL VENOUS BLD VENIPUNCTURE: CPT

## 2024-03-06 PROCEDURE — 86780 TREPONEMA PALLIDUM: CPT

## 2024-03-06 PROCEDURE — 85027 COMPLETE CBC AUTOMATED: CPT

## 2024-03-06 PROCEDURE — 82947 ASSAY GLUCOSE BLOOD QUANT: CPT | Mod: 91

## 2024-03-06 PROCEDURE — 99213 OFFICE O/P EST LOW 20 MIN: CPT

## 2024-03-06 PROCEDURE — 84075 ASSAY ALKALINE PHOSPHATASE: CPT

## 2024-03-06 RX ORDER — LIDOCAINE HYDROCHLORIDE 10 MG/ML
0.5 INJECTION INFILTRATION; PERINEURAL ONCE AS NEEDED
Status: DISCONTINUED | OUTPATIENT
Start: 2024-03-06 | End: 2024-03-06 | Stop reason: HOSPADM

## 2024-03-06 RX ORDER — ONDANSETRON HYDROCHLORIDE 2 MG/ML
4 INJECTION, SOLUTION INTRAVENOUS EVERY 6 HOURS PRN
Status: DISCONTINUED | OUTPATIENT
Start: 2024-03-06 | End: 2024-03-06 | Stop reason: HOSPADM

## 2024-03-06 RX ORDER — LABETALOL HYDROCHLORIDE 5 MG/ML
20 INJECTION, SOLUTION INTRAVENOUS ONCE AS NEEDED
Status: DISCONTINUED | OUTPATIENT
Start: 2024-03-06 | End: 2024-03-06 | Stop reason: HOSPADM

## 2024-03-06 RX ORDER — HYDRALAZINE HYDROCHLORIDE 20 MG/ML
5 INJECTION INTRAMUSCULAR; INTRAVENOUS ONCE AS NEEDED
Status: DISCONTINUED | OUTPATIENT
Start: 2024-03-06 | End: 2024-03-06 | Stop reason: HOSPADM

## 2024-03-06 RX ORDER — NIFEDIPINE 10 MG/1
10 CAPSULE ORAL ONCE AS NEEDED
Status: DISCONTINUED | OUTPATIENT
Start: 2024-03-06 | End: 2024-03-06 | Stop reason: HOSPADM

## 2024-03-06 RX ORDER — METOCLOPRAMIDE 10 MG/1
10 TABLET ORAL ONCE
Status: DISCONTINUED | OUTPATIENT
Start: 2024-03-06 | End: 2024-03-06 | Stop reason: HOSPADM

## 2024-03-06 RX ORDER — ONDANSETRON 4 MG/1
4 TABLET, FILM COATED ORAL EVERY 6 HOURS PRN
Status: DISCONTINUED | OUTPATIENT
Start: 2024-03-06 | End: 2024-03-06 | Stop reason: HOSPADM

## 2024-03-06 SDOH — HEALTH STABILITY: MENTAL HEALTH: SUICIDAL BEHAVIOR (LIFETIME): NO

## 2024-03-06 SDOH — SOCIAL STABILITY: SOCIAL INSECURITY: ARE THERE ANY APPARENT SIGNS OF INJURIES/BEHAVIORS THAT COULD BE RELATED TO ABUSE/NEGLECT?: NO

## 2024-03-06 SDOH — SOCIAL STABILITY: SOCIAL INSECURITY: DOES ANYONE TRY TO KEEP YOU FROM HAVING/CONTACTING OTHER FRIENDS OR DOING THINGS OUTSIDE YOUR HOME?: NO

## 2024-03-06 SDOH — HEALTH STABILITY: MENTAL HEALTH: HAVE YOU USED ANY SUBSTANCES (CANABIS, COCAINE, HEROIN, HALLUCINOGENS, INHALANTS, ETC.) IN THE PAST 12 MONTHS?: NO

## 2024-03-06 SDOH — HEALTH STABILITY: MENTAL HEALTH: WERE YOU ABLE TO COMPLETE ALL THE BEHAVIORAL HEALTH SCREENINGS?: YES

## 2024-03-06 SDOH — SOCIAL STABILITY: SOCIAL INSECURITY: HAS ANYONE EVER THREATENED TO HURT YOUR FAMILY OR YOUR PETS?: NO

## 2024-03-06 SDOH — ECONOMIC STABILITY: HOUSING INSECURITY: DO YOU FEEL UNSAFE GOING BACK TO THE PLACE WHERE YOU ARE LIVING?: NO

## 2024-03-06 SDOH — SOCIAL STABILITY: SOCIAL INSECURITY: DO YOU FEEL ANYONE HAS EXPLOITED OR TAKEN ADVANTAGE OF YOU FINANCIALLY OR OF YOUR PERSONAL PROPERTY?: NO

## 2024-03-06 SDOH — SOCIAL STABILITY: SOCIAL INSECURITY: HAVE YOU HAD THOUGHTS OF HARMING ANYONE ELSE?: NO

## 2024-03-06 SDOH — HEALTH STABILITY: MENTAL HEALTH: HAVE YOU USED ANY PRESCRIPTION DRUGS OTHER THAN PRESCRIBED IN THE PAST 12 MONTHS?: NO

## 2024-03-06 SDOH — SOCIAL STABILITY: SOCIAL INSECURITY: VERBAL ABUSE: DENIES

## 2024-03-06 SDOH — SOCIAL STABILITY: SOCIAL INSECURITY: PHYSICAL ABUSE: DENIES

## 2024-03-06 SDOH — HEALTH STABILITY: MENTAL HEALTH: NON-SPECIFIC ACTIVE SUICIDAL THOUGHTS (PAST 1 MONTH): NO

## 2024-03-06 SDOH — SOCIAL STABILITY: SOCIAL INSECURITY: ABUSE SCREEN: ADULT

## 2024-03-06 SDOH — HEALTH STABILITY: MENTAL HEALTH: WISH TO BE DEAD (PAST 1 MONTH): NO

## 2024-03-06 SDOH — SOCIAL STABILITY: SOCIAL INSECURITY: ARE YOU OR HAVE YOU BEEN THREATENED OR ABUSED PHYSICALLY, EMOTIONALLY, OR SEXUALLY BY ANYONE?: NO

## 2024-03-06 ASSESSMENT — LIFESTYLE VARIABLES
AUDIT-C TOTAL SCORE: 0
HOW MANY STANDARD DRINKS CONTAINING ALCOHOL DO YOU HAVE ON A TYPICAL DAY: PATIENT DOES NOT DRINK
HOW OFTEN DO YOU HAVE A DRINK CONTAINING ALCOHOL: NEVER
AUDIT-C TOTAL SCORE: 0
HOW OFTEN DO YOU HAVE 6 OR MORE DRINKS ON ONE OCCASION: NEVER
SKIP TO QUESTIONS 9-10: 1

## 2024-03-06 ASSESSMENT — PAIN SCALES - GENERAL: PAINLEVEL: 2

## 2024-03-06 ASSESSMENT — PATIENT HEALTH QUESTIONNAIRE - PHQ9
1. LITTLE INTEREST OR PLEASURE IN DOING THINGS: NOT AT ALL
2. FEELING DOWN, DEPRESSED OR HOPELESS: NOT AT ALL
SUM OF ALL RESPONSES TO PHQ9 QUESTIONS 1 & 2: 0

## 2024-03-06 NOTE — DISCHARGE INSTRUCTIONS
Please take your blood pressure twice a day.     If your blood pressure is >140 systolic (top number) or >90 diastolic (bottom number), please call your doctor's office.   If your blood pressure is >160 systolic, or >110 diastolic, please go to Labor and Delivery.    If you have a headache that does not resolve with tylenol, vision changes, chest pain, or new onset shortness of breath, please go to Labor and Delivery.

## 2024-03-06 NOTE — H&P
"Obstetrical Admission History and Physical     Lavonne Booth is a 33 y.o.  at 25w4d by c/w 12wk US presents for elevated Bps at work.    Chief Complaint: elevated blood pressures    Assessment/Plan    Rule out hypertensive disorder of pregnancy  - 2x mild range Bps on hospital cuff at pt work, <4hrs apart.   - Blood pressures normotensive while in triage.  - 1/10 HA on arrival , resolved without medications  - POCT BG colleted due to feeling \"weak\" -> 82, sxs resolved in triage   - HELLP labs pending  - P:C too low to calculate    Fetal status  - NST AGA  - 1hr OGT and 2nd trimester labs done today in triage    Dispo: signed out to night team. Anticipate discharge with BP cuff for home monitoring if HELLP labs wnl.    Seen and discussed with Dr. Aurea Wheeler MD  PGY-2, Obstetrics and Gynecology    Night team update: HELLP labs WNL. Patient discharged.  COSTA Oliveira-CNP     Active Problems:    Elevated blood pressure reading without diagnosis of hypertension      Pregnancy Problems (from 11/10/23 to present)       Problem Noted Resolved    22 weeks gestation of pregnancy 2024 by COSTA Oliveira-CNP No    Priority:  Medium      Prenatal care in second trimester 11/10/2023 by Jose Elias Mendoza MD No    Priority:  Medium      Overview Addendum 2024 11:22 PM by Elli Villar MD     [x] Initial BMI: 23  [x] Dating: LMP 23  [x] Prenatal Labs: ordered  [x] Genetic Screening:  risk-reducing cfDNA; NT done   [x] Baby ASA: not indicated  [x] Anatomy US: unremarkable  [] 1hr GCT at 24-28wks:  [] Tdap (27-36wks):  [x] Flu Shot: 11/10/23  [] COVID vaccine: declined  [] GBS at 36 wks:  [x] Feeding: breast  [] Postpartum Birth control method: undecided  [] 39 weeks discussion of IOL vs. Expectant management:  [x] Mode of delivery:  anticipate          Nausea and vomiting in pregnancy prior to 22 weeks gestation 11/10/2023 by Jose Elias Mendoza MD No    Priority:  " "Medium            Subjective   Lavonne is here complaining of mild range Bps at home.    Patient works as physical therapist at . Was feeling generally \"off\" today, thought her blood pressure was low, however when she took it on the work cuff, it was mild range. Repeat BP check after resting was again mild range.     She currently also has a 1/10 HA, declines medications. Denies CP, SOB, or RUQ pain. She is also feeling extremely thirsty.    Pregnancy notable for:  - asthma, rare albuterol use       Obstetrical History   OB History    Para Term  AB Living   1             SAB IAB Ectopic Multiple Live Births                  # Outcome Date GA Lbr Jasson/2nd Weight Sex Delivery Anes PTL Lv   1 Current                Past Medical History  No past medical history on file.     Past Surgical History   Past Surgical History:   Procedure Laterality Date    EYE SURGERY         Social History  Social History     Tobacco Use    Smoking status: Former    Smokeless tobacco: Never    Tobacco comments:     Pt used vape before pregnancy    Substance Use Topics    Alcohol use: Not Currently     Comment: occasional     Substance and Sexual Activity   Drug Use Never       Allergies  Azithromycin     Medications  Medications Prior to Admission   Medication Sig Dispense Refill Last Dose    cyclobenzaprine (Flexeril) 10 mg tablet Take 1 tablet (10 mg) by mouth 3 times a day as needed for muscle spasms. 20 tablet 0     mupirocin (Bactroban) 2 % cream Apply topically 3 times a day. 30 g 2     prenatal vitamin, iron-folic, (prenatal vit no.130-iron-folic) 27 mg iron-800 mcg folic acid tablet Take 1 tablet by mouth once daily. 90 tablet 3        Objective    Last Vitals  Temp Pulse Resp BP MAP O2 Sat   36.9 °C (98.4 °F) 65 16 110/68   99 %     Physical Examination  General: Well appearing, alert  HEENT: normocephalic, EOMI, clear sclera  Cardio: Warm and well perfused, RRR  Resp: breathing comfortably on room air, CTAB  Abd: " Gravid, nontender  Neuro: grossly intact, no focal deficits  Extremities: full ROM, no calf tenderness  Psych: A&O x3, appropriate mood and affect    Fetal Assessment  FHT: 145/mod/AGA  Fowler: quiet    Lab Review    Lab Results   Component Value Date    [facility]  03/06/2024      Comment:      One or more analytes used in this calculation is outside of the analytical measurement range. Calculation cannot be performed.

## 2024-03-09 PROBLEM — Z3A.25 25 WEEKS GESTATION OF PREGNANCY (HHS-HCC): Status: ACTIVE | Noted: 2024-02-14

## 2024-03-09 NOTE — PROGRESS NOTES
Subjective   Patient ID [id number]   Lavonne Booth is a 33 y.o.  at 25w3d with a working estimated date of delivery of 6/15/2024, by Last Menstrual Period who presents for a routine prenatal visit. She denies vaginal bleeding, leakage of fluid, decreased fetal movements, or contractions.    Her pregnancy is complicated by:  Asthma      Objective   Physical Exam  Weight: 75.8 kg (167 lb)  Expected Total Weight Gain: 11.5 kg (25 lb)-16 kg (35 lb)   Pregravid BMI: 23.49  BP: 116/75  Fetal Heart Rate: 145 Fundal Height (cm): 25 cm         Assessment/Plan   Problem List Items Addressed This Visit       Prenatal care in second trimester    Overview     [x] Initial BMI: 23  [x] Dating: LMP 23  [x] Prenatal Labs: ordered  [x] Genetic Screening:  risk-reducing cfDNA; NT done   [x] Baby ASA: not indicated  [x] Anatomy US: unremarkable  [] 1hr GCT at 24-28wks:  [] Tdap (27-36wks):  [x] Flu Shot: 11/10/23  [] COVID vaccine: declined  [] GBS at 36 wks:  [x] Feeding: breast  [] Postpartum Birth control method: undecided  [] 39 weeks discussion of IOL vs. 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  GTT ordered, intends to draw at work tomorrow.  Follow up in 4 weeks for a routine prenatal visit.

## 2024-03-21 ENCOUNTER — HOSPITAL ENCOUNTER (OUTPATIENT)
Dept: CARDIOLOGY | Facility: HOSPITAL | Age: 34
Discharge: HOME | End: 2024-03-21
Payer: COMMERCIAL

## 2024-03-21 ENCOUNTER — HOSPITAL ENCOUNTER (OUTPATIENT)
Facility: HOSPITAL | Age: 34
End: 2024-03-21
Attending: STUDENT IN AN ORGANIZED HEALTH CARE EDUCATION/TRAINING PROGRAM | Admitting: STUDENT IN AN ORGANIZED HEALTH CARE EDUCATION/TRAINING PROGRAM
Payer: COMMERCIAL

## 2024-03-21 ENCOUNTER — TELEPHONE (OUTPATIENT)
Dept: OBSTETRICS AND GYNECOLOGY | Facility: HOSPITAL | Age: 34
End: 2024-03-21
Payer: COMMERCIAL

## 2024-03-21 ENCOUNTER — HOSPITAL ENCOUNTER (OUTPATIENT)
Facility: HOSPITAL | Age: 34
Discharge: HOME | End: 2024-03-21
Attending: STUDENT IN AN ORGANIZED HEALTH CARE EDUCATION/TRAINING PROGRAM | Admitting: STUDENT IN AN ORGANIZED HEALTH CARE EDUCATION/TRAINING PROGRAM
Payer: COMMERCIAL

## 2024-03-21 VITALS
OXYGEN SATURATION: 97 % | TEMPERATURE: 97.2 F | SYSTOLIC BLOOD PRESSURE: 119 MMHG | HEART RATE: 83 BPM | DIASTOLIC BLOOD PRESSURE: 71 MMHG

## 2024-03-21 LAB
ATRIAL RATE: 79 BPM
BILIRUBIN, POC: NEGATIVE
BLOOD URINE, POC: NEGATIVE
CLARITY, POC: CLEAR
CLUE CELLS SPEC QL WET PREP: NORMAL
COLOR, POC: YELLOW
GLUCOSE URINE, POC: NEGATIVE
KETONES, POC: NEGATIVE
LEUKOCYTE EST, POC: NEGATIVE
NITRITE, POC: NEGATIVE
P AXIS: 23 DEGREES
P OFFSET: 197 MS
P ONSET: 159 MS
PH, POC: 7
POC APPEARANCE OF BODY FLUID: CLEAR
PR INTERVAL: 134 MS
Q ONSET: 226 MS
QRS COUNT: 13 BEATS
QRS DURATION: 66 MS
QT INTERVAL: 354 MS
QTC CALCULATION(BAZETT): 405 MS
QTC FREDERICIA: 388 MS
R AXIS: 60 DEGREES
SPECIFIC GRAVITY, POC: 1.02
T AXIS: 13 DEGREES
T OFFSET: 403 MS
T VAGINALIS SPEC QL WET PREP: NORMAL
URINE PROTEIN, POC: NEGATIVE
UROBILINOGEN, POC: 0.2
VENTRICULAR RATE: 79 BPM
WBC VAG QL WET PREP: NORMAL
YEAST VAG QL WET PREP: NORMAL

## 2024-03-21 PROCEDURE — 87529 HSV DNA AMP PROBE: CPT | Performed by: OBSTETRICS & GYNECOLOGY

## 2024-03-21 PROCEDURE — 99213 OFFICE O/P EST LOW 20 MIN: CPT

## 2024-03-21 PROCEDURE — 87210 SMEAR WET MOUNT SALINE/INK: CPT | Performed by: OBSTETRICS & GYNECOLOGY

## 2024-03-21 PROCEDURE — 93005 ELECTROCARDIOGRAM TRACING: CPT

## 2024-03-21 PROCEDURE — 59025 FETAL NON-STRESS TEST: CPT | Performed by: FAMILY MEDICINE

## 2024-03-21 PROCEDURE — 81002 URINALYSIS NONAUTO W/O SCOPE: CPT | Performed by: OBSTETRICS & GYNECOLOGY

## 2024-03-21 PROCEDURE — 99214 OFFICE O/P EST MOD 30 MIN: CPT | Performed by: FAMILY MEDICINE

## 2024-03-21 PROCEDURE — 93010 ELECTROCARDIOGRAM REPORT: CPT | Performed by: INTERNAL MEDICINE

## 2024-03-21 RX ORDER — HYDRALAZINE HYDROCHLORIDE 20 MG/ML
5 INJECTION INTRAMUSCULAR; INTRAVENOUS ONCE AS NEEDED
Status: DISCONTINUED | OUTPATIENT
Start: 2024-03-21 | End: 2024-03-21 | Stop reason: HOSPADM

## 2024-03-21 RX ORDER — LABETALOL HYDROCHLORIDE 5 MG/ML
20 INJECTION, SOLUTION INTRAVENOUS ONCE AS NEEDED
Status: DISCONTINUED | OUTPATIENT
Start: 2024-03-21 | End: 2024-03-21 | Stop reason: HOSPADM

## 2024-03-21 RX ORDER — NIFEDIPINE 10 MG/1
10 CAPSULE ORAL ONCE AS NEEDED
Status: DISCONTINUED | OUTPATIENT
Start: 2024-03-21 | End: 2024-03-21 | Stop reason: HOSPADM

## 2024-03-21 RX ORDER — LIDOCAINE HYDROCHLORIDE 10 MG/ML
0.5 INJECTION INFILTRATION; PERINEURAL ONCE AS NEEDED
Status: DISCONTINUED | OUTPATIENT
Start: 2024-03-21 | End: 2024-03-21 | Stop reason: HOSPADM

## 2024-03-21 ASSESSMENT — PAIN SCALES - GENERAL
PAINLEVEL: 0 - NO PAIN
PAINLEVEL_OUTOF10: 0 - NO PAIN

## 2024-03-21 NOTE — TELEPHONE ENCOUNTER
"Patient believes that she has a staph infection on her nose, and it has been reoccurring. She was prescribed a topical antibiotic, mupirocin. It has been re occurrent for the past two years. She currently reports that she feels sick. +FM, denies any uterine cramping, denies vaginal bleeding, unsure if she has a loss of vaginal fluid, she reports that the fluid is clear and was enough to fill her underwear. Denies any odor, reports that she feels as though \"she pee'd her pants\" yesterday evening but she thought that this was a normal. We discussed that increased vaginal discharge is normal, but clear fluid that presents as a \"gush\", or trickling is worrisome for premature rupture of membranes. Encouraged patient to present to OB triage for evaluation. The patient was agreeable.A ll questions and concerns were addressed.  Arielle QUINONESN, RN, CLC   "

## 2024-03-21 NOTE — H&P
Obstetrical TRIAGE History and Physical       Lavonne Booth is a 33 y.o. . 27w5d dated by LMP c/w 12w US.      Chief Complaint: Infection (Nose infection) and Rupture of Membranes (Possible SROM 3/20 in am. Jacquelin SPARROW notified)      Assessment/Plan      R/o PROM  - Pooling/nitrazine/ferning negative x3  - SVE: thin, white discharge vaginal vault; cervix closed  - Fords: quiet  - PATRICK 17cm per H Jacquelin LAWRENCEN/DUSTIN Rossi APRN  - wet prep sent, will call if abnormal  - pt feels reassured     SOB  - sx c/w physiologic changes of pregnancy  - EKG NSR  - VSS, no tachycardia or hypoxia and lungs CTA  - to return to triage if experiencing CP, palpitations, severe SOB, lightheadedness/dizziness    Skin infection  - sx c/w possible superficial skin irritation/infection  - continue with Mupirocin TID until resolved  - swab for HSV sent, will call with positive results    IUP at 27w5d  - NST AGA  - Good fetal movement  - Continue routine prenatal care, call to schedule follow up for persistent or worsening sx  - Precautions to return discussed    Maternal Well-being  - All questions and concerns addressed    Dispo  - patient appropriate for d/c home, agrees with plan  - f/u at next scheduled OB appt or to triage sooner as needed      Discussed plan and reviewed tracing with Dr. Alexis Whitfield, APRN-CNP      Active Problems:  There are no active Hospital Problems.      Pregnancy Problems (from 11/10/23 to present)       Problem Noted Resolved    Elevated blood pressure reading without diagnosis of hypertension 3/6/2024 by Rubina Wheeler MD No    Priority:  Medium      Overview Addendum 3/6/2024  4:38 PM by Rubina Wheeler MD     2x mild range Bps at work (hospital cuff) at 25wga  HELLP labs wnl 3/6, P:C too low to calculate         25 weeks gestation of pregnancy 2024 by Rocio Florian, APRN-CNP No    Priority:  Medium      Prenatal care in second trimester 11/10/2023 by Jose Elias Mendoza MD No    Priority:  " Medium      Overview Addendum 2024 11:22 PM by Elli Villar MD     [x] Initial BMI: 23  [x] Dating: LMP 23  [x] Prenatal Labs: ordered  [x] Genetic Screening:  risk-reducing cfDNA; NT done   [x] Baby ASA: not indicated  [x] Anatomy US: unremarkable  [] 1hr GCT at 24-28wks:  [] Tdap (27-36wks):  [x] Flu Shot: 11/10/23  [] COVID vaccine: declined  [] GBS at 36 wks:  [x] Feeding: breast  [] Postpartum Birth control method: undecided  [] 39 weeks discussion of IOL vs. Expectant management:  [x] Mode of delivery:  anticipate          Nausea and vomiting in pregnancy prior to 22 weeks gestation 11/10/2023 by Jose Elias Mendoza MD No    Priority:  Medium            Subjective   Lavonne is here for r/o PROM. Experienced a \"gush\" of clear fluid 2 days ago, believe she may have urinated but denied odor. Yesterday had 2 episodes of a similar sensation and again thought it to be urine. Denies vaginal itching or change in discharge. Denies vag bleeding, contractions, dec fetal movement.    Experiencing intermittent SOB with movement and using stairs at work. Denies orthopnea, BLE edema, CP, palpitations, lightheadedness/dizziness.    Superficial skin infection on tip of nose starting Monday. This condition has been ongoing x10 yrs, tends to flare in times of stress. Had a very stressful week at work last week, more emotional during pregnancy. Denies fever/chills, drainage, spreading erythema or streaking. Started applying Mupirocin right away.       Obstetrical History   OB History    Para Term  AB Living   1             SAB IAB Ectopic Multiple Live Births                  # Outcome Date GA Lbr Jasson/2nd Weight Sex Delivery Anes PTL Lv   1 Current                Past Medical History  No past medical history on file.     Past Surgical History   Past Surgical History:   Procedure Laterality Date    EYE SURGERY         Social History  Social History     Tobacco Use    Smoking status: Former    " Smokeless tobacco: Never    Tobacco comments:     Pt used vape before pregnancy    Substance Use Topics    Alcohol use: Not Currently     Comment: occasional     Substance and Sexual Activity   Drug Use Never       Allergies  Azithromycin     Medications  Medications Prior to Admission   Medication Sig Dispense Refill Last Dose    cyclobenzaprine (Flexeril) 10 mg tablet Take 1 tablet (10 mg) by mouth 3 times a day as needed for muscle spasms. 20 tablet 0 3/21/2024    mupirocin (Bactroban) 2 % cream Apply topically 3 times a day. 30 g 2 3/21/2024    prenatal vitamin, iron-folic, (prenatal vit no.130-iron-folic) 27 mg iron-800 mcg folic acid tablet Take 1 tablet by mouth once daily. 90 tablet 3 3/21/2024       Objective    Last Vitals  Temp Pulse Resp BP MAP O2 Sat   36.2 °C (97.2 °F) 89   119/71   96 %       Physical Exam  Constitutional:       Appearance: Normal appearance.   HENT:      Head: Normocephalic.      Nose:        Comments: Dry, flaky skin in noted area - no evidence of drainage or streaking  Cardiovascular:      Rate and Rhythm: Normal rate and regular rhythm.      Heart sounds: Normal heart sounds.   Pulmonary:      Effort: Pulmonary effort is normal.      Breath sounds: Normal breath sounds.   Abdominal:      General: Bowel sounds are normal.      Palpations: Abdomen is soft.      Tenderness: There is no abdominal tenderness.   Genitourinary:     Comments: Cervix: closed  Vagina: thin, white discharge in vaginal vault   Pooling/nitrazine/ferning negative x3  FHR: 145, mod variability, +accels, -decels  [place] reading:  quiet    Skin:     General: Skin is warm and dry.   Neurological:      Mental Status: She is alert and oriented to person, place, and time.   Psychiatric:         Mood and Affect: Mood normal.         Behavior: Behavior normal.

## 2024-03-22 DIAGNOSIS — B00.9 HERPES SIMPLEX INFECTION: Primary | ICD-10-CM

## 2024-03-22 DIAGNOSIS — Z3A.27 27 WEEKS GESTATION OF PREGNANCY (HHS-HCC): ICD-10-CM

## 2024-03-22 LAB
HSV1 DNA SKIN QL NAA+PROBE: DETECTED
HSV2 DNA SKIN QL NAA+PROBE: NOT DETECTED

## 2024-03-22 RX ORDER — VALACYCLOVIR HYDROCHLORIDE 500 MG/1
500 TABLET, FILM COATED ORAL 2 TIMES DAILY
Qty: 6 TABLET | Refills: 0 | Status: SHIPPED | OUTPATIENT
Start: 2024-03-22 | End: 2024-03-29 | Stop reason: SDUPTHER

## 2024-03-29 DIAGNOSIS — B00.9 HERPES SIMPLEX INFECTION: ICD-10-CM

## 2024-03-29 DIAGNOSIS — Z3A.27 27 WEEKS GESTATION OF PREGNANCY (HHS-HCC): ICD-10-CM

## 2024-03-29 RX ORDER — VALACYCLOVIR HYDROCHLORIDE 500 MG/1
500 TABLET, FILM COATED ORAL 2 TIMES DAILY
Qty: 14 TABLET | Refills: 0 | Status: SHIPPED | OUTPATIENT
Start: 2024-03-29 | End: 2024-04-05

## 2024-04-09 ENCOUNTER — ROUTINE PRENATAL (OUTPATIENT)
Dept: OBSTETRICS AND GYNECOLOGY | Facility: HOSPITAL | Age: 34
End: 2024-04-09
Payer: COMMERCIAL

## 2024-04-09 ENCOUNTER — PROCEDURE VISIT (OUTPATIENT)
Dept: OBSTETRICS AND GYNECOLOGY | Facility: HOSPITAL | Age: 34
End: 2024-04-09
Payer: COMMERCIAL

## 2024-04-09 VITALS — WEIGHT: 171 LBS | SYSTOLIC BLOOD PRESSURE: 126 MMHG | DIASTOLIC BLOOD PRESSURE: 79 MMHG | BODY MASS INDEX: 26.78 KG/M2

## 2024-04-09 DIAGNOSIS — R03.0 ELEVATED BLOOD PRESSURE READING WITHOUT DIAGNOSIS OF HYPERTENSION: ICD-10-CM

## 2024-04-09 DIAGNOSIS — Z34.92 PRENATAL CARE IN SECOND TRIMESTER (HHS-HCC): ICD-10-CM

## 2024-04-09 DIAGNOSIS — Z3A.30 30 WEEKS GESTATION OF PREGNANCY (HHS-HCC): ICD-10-CM

## 2024-04-09 DIAGNOSIS — O36.8130 DECREASED FETAL MOVEMENTS IN THIRD TRIMESTER, SINGLE OR UNSPECIFIED FETUS (HHS-HCC): Primary | ICD-10-CM

## 2024-04-09 DIAGNOSIS — Z3A.25 25 WEEKS GESTATION OF PREGNANCY (HHS-HCC): ICD-10-CM

## 2024-04-09 DIAGNOSIS — Z34.93 PRENATAL CARE IN THIRD TRIMESTER (HHS-HCC): ICD-10-CM

## 2024-04-09 DIAGNOSIS — O21.9 NAUSEA AND VOMITING IN PREGNANCY PRIOR TO 22 WEEKS GESTATION (HHS-HCC): ICD-10-CM

## 2024-04-09 PROCEDURE — 90715 TDAP VACCINE 7 YRS/> IM: CPT | Performed by: STUDENT IN AN ORGANIZED HEALTH CARE EDUCATION/TRAINING PROGRAM

## 2024-04-09 PROCEDURE — 0501F PRENATAL FLOW SHEET: CPT | Performed by: STUDENT IN AN ORGANIZED HEALTH CARE EDUCATION/TRAINING PROGRAM

## 2024-04-09 NOTE — PROGRESS NOTES
Subjective   Patient ID [id number]   Lavonne Booth is a 33 y.o.  at 30w3d with a working estimated date of delivery of 6/15/2024, by Last Menstrual Period who presents for a routine prenatal visit. She denies vaginal bleeding, leakage of fluid, or contractions, but does note that yesterday and the day before she felt less fetal movement.  Movement feels better today.    Her pregnancy is complicated by:  - Asthma, mild intermittent    Objective   Physical Exam:   Weight: 77.6 kg (171 lb)  Expected Total Weight Gain: 11.5 kg (25 lb)-16 kg (35 lb)   Pregravid BMI: 23.49  BP: 126/79  Fetal Heart Rate: NST Fundal Height (cm): 30 cm           NST obtained for decreased movement last two days, though movement better today:  Non-Stress Test   Baseline Fetal Heart Rate for Non-Stress Test: 140 BPM  Variability in Waveform for Non-Stress Test: Moderate  Accelerations in Non-Stress Test: Yes, greater than/equal to 15 bpm, lasting at least 15 seconds  Decelerations in Non-Stress Test: None  Contractions in Non-Stress Test: Not present  Acoustic Stimulator for Non-Stress Test: No  Interpretation of Non-Stress Test   Interpretation of Non-Stress Test: Reactive                           Assessment/Plan   Problem List Items Addressed This Visit       Prenatal care in third trimester ([facility])    Overview     [x] Initial BMI: 23  [x] Dating: LMP 23  [x] Prenatal Labs: ordered  [x] Genetic Screening:  risk-reducing cfDNA; NT done   [x] Baby ASA: not indicated  [x] Anatomy US: unremarkable  [] 1hr GCT at 24-28wks:  [] Tdap (27-36wks):  [x] Flu Shot: 11/10/23  [] COVID vaccine: declined  [] GBS at 36 wks:  [x] Feeding: breast  [] Postpartum Birth control method: undecided  [] 39 weeks discussion of IOL vs. Expectant management:  [x] Mode of delivery:  anticipate          30 weeks gestation of pregnancy ([facility])     Other Visit Diagnoses       Decreased fetal movements in third trimester, single or unspecified fetus  ([facility])    -  Primary    Relevant Orders    Fetal nonstress test, once          Continue prenatal vitamin.  Follow up in 2 weeks for a routine prenatal visit.

## 2024-04-15 PROBLEM — Z3A.30 30 WEEKS GESTATION OF PREGNANCY (HHS-HCC): Status: ACTIVE | Noted: 2024-02-14

## 2024-04-15 PROBLEM — O21.9 NAUSEA AND VOMITING IN PREGNANCY PRIOR TO 22 WEEKS GESTATION (HHS-HCC): Status: RESOLVED | Noted: 2023-11-10 | Resolved: 2024-04-15

## 2024-04-15 PROBLEM — Z34.93 PRENATAL CARE IN THIRD TRIMESTER (HHS-HCC): Status: ACTIVE | Noted: 2023-11-10

## 2024-04-15 PROBLEM — N60.19 FIBROCYSTIC BREAST: Status: RESOLVED | Noted: 2021-07-07 | Resolved: 2024-04-15

## 2024-04-15 PROBLEM — N94.3 PMS (PREMENSTRUAL SYNDROME): Status: RESOLVED | Noted: 2021-07-07 | Resolved: 2024-04-15

## 2024-04-15 PROBLEM — N94.6 DYSMENORRHEA: Status: RESOLVED | Noted: 2021-07-07 | Resolved: 2024-04-15

## 2024-04-15 PROBLEM — Z23 NEED FOR IMMUNIZATION AGAINST INFLUENZA: Status: RESOLVED | Noted: 2023-11-10 | Resolved: 2024-04-15

## 2024-04-23 ENCOUNTER — ROUTINE PRENATAL (OUTPATIENT)
Dept: OBSTETRICS AND GYNECOLOGY | Facility: HOSPITAL | Age: 34
End: 2024-04-23
Payer: COMMERCIAL

## 2024-04-23 VITALS — WEIGHT: 174 LBS | BODY MASS INDEX: 27.25 KG/M2 | SYSTOLIC BLOOD PRESSURE: 114 MMHG | DIASTOLIC BLOOD PRESSURE: 71 MMHG

## 2024-04-23 DIAGNOSIS — M94.0 COSTOCHONDRITIS: ICD-10-CM

## 2024-04-23 DIAGNOSIS — R03.0 ELEVATED BLOOD PRESSURE READING WITHOUT DIAGNOSIS OF HYPERTENSION: ICD-10-CM

## 2024-04-23 DIAGNOSIS — Z34.93 PRENATAL CARE IN THIRD TRIMESTER (HHS-HCC): ICD-10-CM

## 2024-04-23 DIAGNOSIS — Z3A.22 22 WEEKS GESTATION OF PREGNANCY (HHS-HCC): ICD-10-CM

## 2024-04-23 DIAGNOSIS — Z3A.30 30 WEEKS GESTATION OF PREGNANCY (HHS-HCC): ICD-10-CM

## 2024-04-23 PROCEDURE — 0501F PRENATAL FLOW SHEET: CPT | Performed by: OBSTETRICS & GYNECOLOGY

## 2024-04-23 RX ORDER — CYCLOBENZAPRINE HCL 10 MG
10 TABLET ORAL 3 TIMES DAILY PRN
Qty: 20 TABLET | Refills: 0 | Status: SHIPPED | OUTPATIENT
Start: 2024-04-23

## 2024-04-23 ASSESSMENT — ENCOUNTER SYMPTOMS
DEPRESSION: 0
OCCASIONAL FEELINGS OF UNSTEADINESS: 0
LOSS OF SENSATION IN FEET: 0

## 2024-04-23 NOTE — PROGRESS NOTES
SUBJECTIVE  Lavonne Booth is a 33 y.o.  at 32w3d with an estimated date of delivery of 6/15/2024, by Last Menstrual Period who presents for a routine prenatal visit.    She denies loss of fluid, vaginal bleeding, regular contractions/cramping, and decreased fetal movement.     OBJECTIVE  Vitals:    24 1608   BP: 114/71   Weight: 78.9 kg (174 lb)          ASSESSMENT & PLAN    Prenatal care in third trimester ([facility])  - Up to date on care  - Flexeril refilled    Follow up in 2 weeks for return OB visit.    Jennifer Gee MD  Obstetrics & Gynecology  24

## 2024-04-30 ENCOUNTER — TELEPHONE (OUTPATIENT)
Dept: OBSTETRICS AND GYNECOLOGY | Facility: HOSPITAL | Age: 34
End: 2024-04-30

## 2024-04-30 ENCOUNTER — TELEPHONE (OUTPATIENT)
Dept: OBSTETRICS AND GYNECOLOGY | Facility: HOSPITAL | Age: 34
End: 2024-04-30
Payer: COMMERCIAL

## 2024-04-30 NOTE — TELEPHONE ENCOUNTER
Patient called office to report pelvic/groin pain/pressure  Patient identified by name and   Patient is 33.3 weeks aling  Patient states she is having pain and pressure in her groin where it is painful to walk  Pain previously would come and go but is now more constant today  Patient denies any vaginal bleeding or loss of fluid  Patient endorses good fetal movement  Patient has flexeril on med list, inquired if patient has tried taking that or tylenol for pain  Patient has not taken medication yet  Patient ordered belly band yesterday  Instructed patient to try and rest, do some stretches, hydrate, and take medication to help with pain and if symptoms persist, worsen, or are accompanied by any of the above symptoms to present to triage asap for evaluation  Patient verbalized understanding  Encouraged patient to call office with any questions or concerns  Jennifer Sprague RN

## 2024-05-07 ENCOUNTER — ROUTINE PRENATAL (OUTPATIENT)
Dept: OBSTETRICS AND GYNECOLOGY | Facility: HOSPITAL | Age: 34
End: 2024-05-07
Payer: COMMERCIAL

## 2024-05-07 VITALS — BODY MASS INDEX: 27.72 KG/M2 | DIASTOLIC BLOOD PRESSURE: 74 MMHG | WEIGHT: 177 LBS | SYSTOLIC BLOOD PRESSURE: 119 MMHG

## 2024-05-07 DIAGNOSIS — B00.9 CHRONIC MUCOCUTANEOUS INFECTION DUE TO HERPES SIMPLEX VIRUS (HSV): ICD-10-CM

## 2024-05-07 DIAGNOSIS — Z3A.34 34 WEEKS GESTATION OF PREGNANCY (HHS-HCC): ICD-10-CM

## 2024-05-07 DIAGNOSIS — Z34.93 PRENATAL CARE IN THIRD TRIMESTER (HHS-HCC): Primary | ICD-10-CM

## 2024-05-07 PROBLEM — R42 DIZZINESS: Status: RESOLVED | Noted: 2023-11-10 | Resolved: 2024-05-07

## 2024-05-07 PROCEDURE — 0501F PRENATAL FLOW SHEET: CPT | Performed by: STUDENT IN AN ORGANIZED HEALTH CARE EDUCATION/TRAINING PROGRAM

## 2024-05-07 RX ORDER — VALACYCLOVIR HYDROCHLORIDE 500 MG/1
TABLET, FILM COATED ORAL
Qty: 6 TABLET | Refills: 11 | Status: SHIPPED | OUTPATIENT
Start: 2024-05-07 | End: 2024-06-03 | Stop reason: HOSPADM

## 2024-05-07 ASSESSMENT — ENCOUNTER SYMPTOMS
DEPRESSION: 0
LOSS OF SENSATION IN FEET: 0
OCCASIONAL FEELINGS OF UNSTEADINESS: 0

## 2024-05-07 NOTE — PROGRESS NOTES
Subjective   Patient ID [id number]   Lavonne Booth is a 33 y.o.  at 34w3d with a working estimated date of delivery of 6/15/2024, by Last Menstrual Period who presents for a routine prenatal visit. She denies vaginal bleeding, leakage of fluid, decreased fetal movements, or contractions.    Her pregnancy is complicated by:  - none to date    Objective   Physical Exam:   Weight: 80.3 kg (177 lb)  Expected Total Weight Gain: 11.5 kg (25 lb)-16 kg (35 lb)   Pregravid BMI: 23.49  BP: 119/74  Fetal Heart Rate: 130 Fundal Height (cm): 35 cm             Assessment/Plan   Resend valtrex HSV of nasal mucosa in case of recurrence, per pt request.  Continue prenatal vitamin.  Follow up in 2 weeks for a routine prenatal visit.    Elli Villar MD

## 2024-05-21 ENCOUNTER — ROUTINE PRENATAL (OUTPATIENT)
Dept: OBSTETRICS AND GYNECOLOGY | Facility: HOSPITAL | Age: 34
End: 2024-05-21
Payer: COMMERCIAL

## 2024-05-21 VITALS — SYSTOLIC BLOOD PRESSURE: 118 MMHG | WEIGHT: 177 LBS | BODY MASS INDEX: 27.72 KG/M2 | DIASTOLIC BLOOD PRESSURE: 80 MMHG

## 2024-05-21 DIAGNOSIS — Z3A.36 36 WEEKS GESTATION OF PREGNANCY (HHS-HCC): ICD-10-CM

## 2024-05-21 DIAGNOSIS — R03.0 ELEVATED BLOOD PRESSURE READING WITHOUT DIAGNOSIS OF HYPERTENSION: ICD-10-CM

## 2024-05-21 DIAGNOSIS — B00.9 CHRONIC MUCOCUTANEOUS INFECTION DUE TO HERPES SIMPLEX VIRUS (HSV): ICD-10-CM

## 2024-05-21 DIAGNOSIS — Z34.93 PRENATAL CARE IN THIRD TRIMESTER (HHS-HCC): ICD-10-CM

## 2024-05-21 PROCEDURE — 87081 CULTURE SCREEN ONLY: CPT | Performed by: STUDENT IN AN ORGANIZED HEALTH CARE EDUCATION/TRAINING PROGRAM

## 2024-05-21 PROCEDURE — 0501F PRENATAL FLOW SHEET: CPT | Performed by: STUDENT IN AN ORGANIZED HEALTH CARE EDUCATION/TRAINING PROGRAM

## 2024-05-22 NOTE — PROGRESS NOTES
Subjective   Patient ID [id number]   Lavonne Booth is a 33 y.o.  at 36w3d with a working estimated date of delivery of 6/15/2024, by Last Menstrual Period who presents for a routine prenatal visit. She denies vaginal bleeding, leakage of fluid, decreased fetal movements. Notes irregular contractions.     Pregnancy uncomplicated.     Objective   Physical Exam:   Weight: 80.3 kg (177 lb)  Expected Total Weight Gain: 11.5 kg (25 lb)-16 kg (35 lb)   Pregravid BMI: 23.49  BP: 118/80  Fetal Heart Rate: 113 Fundal Height (cm): 35 cm      Prenatal Labs  Urine Dip:  Lab Results   Component Value Date    KETONESU Negative 2024    GLUCOSEUR NEGATIVE 2024    LEUKOCYTESUR NEGATIVE 2024     Lab Results   Component Value Date    HGB 11.6 (L) 2024    HCT 33.8 (L) 2024    ABO O 11/10/2023    HEPBSAG Nonreactive 11/10/2023     Assessment/Plan   Problem List Items Addressed This Visit       36 weeks gestation of pregnancy ([facility])    Relevant Orders    Group B Streptococcus (GBS) Prenatal Screen, Culture    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)    Overview     - intranasal HSV diagnosed in triage 3/21 however patient had been experiencing recurrent intranasal lesions for years, approximately 4 times per year or fewer  - no history of HSV affecting the genitalia  - counseled that direct contact with the lesion can result in transmission and to take care with hand hygiene with  however no risk of intrapartum transmission         Elevated blood pressure reading without diagnosis of hypertension    Overview     2x mild range Bps at work (hospital cuff) at 25wga  HELLP labs wnl 3/6, P:C too low to calculate         Prenatal care in third trimester ([facility])    Overview     [x] Initial BMI: 23  [x] Dating: LMP 23  [x] Prenatal Labs: ordered  [x] Genetic Screening:  risk-reducing cfDNA; NT done   [x] Baby ASA: not indicated  [x] Anatomy US: unremarkable  [x] 1hr GCT  at 24-28wks:  [x] Tdap (27-36wks):  [x] Flu Shot: 11/10/23  [x] COVID vaccine: declined  [x] GBS at 36 wks: collected   [x] Feeding: breast, working on picking out a pump  [] Postpartum Birth control method: undecided  [x] 39 weeks discussion of IOL vs. Expectant management: undecided, will readdress   [x] Mode of delivery:  anticipate           Continue prenatal vitamin.  Labs reviewed.  GBS collected today.   Expected mode of delivery .  Follow up in 1 week for a routine prenatal visit.    Seen and discussed with Dr. Aurea Lynn MD

## 2024-05-24 ENCOUNTER — TELEPHONE (OUTPATIENT)
Dept: OBSTETRICS AND GYNECOLOGY | Facility: HOSPITAL | Age: 34
End: 2024-05-24
Payer: COMMERCIAL

## 2024-05-24 NOTE — TELEPHONE ENCOUNTER
Patient emailed order for breast pump to RN email  Will fill out and have provider sign when she is next in office  Encouraged patient to call office with any questions or concerns  Jennifer Sprague RN

## 2024-05-26 LAB — GP B STREP GENITAL QL CULT: NORMAL

## 2024-05-26 NOTE — PROGRESS NOTES
I saw and evaluated the patient. I personally obtained the key and critical portions of the history and physical exam or was physically present for key and critical portions performed by the resident/fellow. I reviewed the resident/fellow's documentation and discussed the patient with the resident/fellow. I agree with the resident/fellow's medical decision making as documented in the note.    Elli Villar MD

## 2024-05-28 ENCOUNTER — ANESTHESIA EVENT (OUTPATIENT)
Dept: OBSTETRICS AND GYNECOLOGY | Facility: HOSPITAL | Age: 34
End: 2024-05-28
Payer: COMMERCIAL

## 2024-05-28 ENCOUNTER — ROUTINE PRENATAL (OUTPATIENT)
Dept: OBSTETRICS AND GYNECOLOGY | Facility: HOSPITAL | Age: 34
End: 2024-05-28
Payer: COMMERCIAL

## 2024-05-28 ENCOUNTER — DOCUMENTATION (OUTPATIENT)
Dept: OBSTETRICS AND GYNECOLOGY | Facility: HOSPITAL | Age: 34
End: 2024-05-28
Payer: COMMERCIAL

## 2024-05-28 ENCOUNTER — HOSPITAL ENCOUNTER (INPATIENT)
Facility: HOSPITAL | Age: 34
LOS: 6 days | Discharge: HOME | End: 2024-06-03
Attending: STUDENT IN AN ORGANIZED HEALTH CARE EDUCATION/TRAINING PROGRAM | Admitting: OBSTETRICS & GYNECOLOGY
Payer: COMMERCIAL

## 2024-05-28 ENCOUNTER — APPOINTMENT (OUTPATIENT)
Dept: OBSTETRICS AND GYNECOLOGY | Facility: HOSPITAL | Age: 34
End: 2024-05-28
Payer: COMMERCIAL

## 2024-05-28 ENCOUNTER — ANESTHESIA (OUTPATIENT)
Dept: OBSTETRICS AND GYNECOLOGY | Facility: HOSPITAL | Age: 34
End: 2024-05-28
Payer: COMMERCIAL

## 2024-05-28 VITALS — SYSTOLIC BLOOD PRESSURE: 121 MMHG | WEIGHT: 178 LBS | BODY MASS INDEX: 27.88 KG/M2 | DIASTOLIC BLOOD PRESSURE: 80 MMHG

## 2024-05-28 DIAGNOSIS — D62 ABLA (ACUTE BLOOD LOSS ANEMIA): ICD-10-CM

## 2024-05-28 DIAGNOSIS — E87.79 OTHER HYPERVOLEMIA: ICD-10-CM

## 2024-05-28 DIAGNOSIS — Z3A.37 37 WEEKS GESTATION OF PREGNANCY (HHS-HCC): ICD-10-CM

## 2024-05-28 DIAGNOSIS — R79.89 ELEVATED BRAIN NATRIURETIC PEPTIDE (BNP) LEVEL: ICD-10-CM

## 2024-05-28 DIAGNOSIS — O36.8131 DECREASED FETAL MOVEMENTS IN THIRD TRIMESTER, FETUS 1 OF MULTIPLE GESTATION (HHS-HCC): Primary | ICD-10-CM

## 2024-05-28 DIAGNOSIS — R06.01 ORTHOPNEA: ICD-10-CM

## 2024-05-28 PROBLEM — O36.8130 DECREASED FETAL MOVEMENTS IN THIRD TRIMESTER (HHS-HCC): Status: ACTIVE | Noted: 2024-05-28

## 2024-05-28 PROBLEM — R11.2 PONV (POSTOPERATIVE NAUSEA AND VOMITING): Status: ACTIVE | Noted: 2024-05-28

## 2024-05-28 PROBLEM — Z98.890 PONV (POSTOPERATIVE NAUSEA AND VOMITING): Status: ACTIVE | Noted: 2024-05-28

## 2024-05-28 PROBLEM — Z34.90 ENCOUNTER FOR INDUCTION OF LABOR (HHS-HCC): Status: ACTIVE | Noted: 2024-05-28

## 2024-05-28 LAB
ABO GROUP (TYPE) IN BLOOD: NORMAL
ANTIBODY SCREEN: NORMAL
ERYTHROCYTE [DISTWIDTH] IN BLOOD BY AUTOMATED COUNT: 11.7 % (ref 11.5–14.5)
HCT VFR BLD AUTO: 37 % (ref 36–46)
HGB BLD-MCNC: 12.5 G/DL (ref 12–16)
MCH RBC QN AUTO: 31.6 PG (ref 26–34)
MCHC RBC AUTO-ENTMCNC: 33.8 G/DL (ref 32–36)
MCV RBC AUTO: 94 FL (ref 80–100)
NRBC BLD-RTO: 0 /100 WBCS (ref 0–0)
PLATELET # BLD AUTO: 215 X10*3/UL (ref 150–450)
RBC # BLD AUTO: 3.95 X10*6/UL (ref 4–5.2)
RH FACTOR (ANTIGEN D): NORMAL
WBC # BLD AUTO: 11.7 X10*3/UL (ref 4.4–11.3)

## 2024-05-28 PROCEDURE — 59025 FETAL NON-STRESS TEST: CPT | Performed by: STUDENT IN AN ORGANIZED HEALTH CARE EDUCATION/TRAINING PROGRAM

## 2024-05-28 PROCEDURE — 2500000004 HC RX 250 GENERAL PHARMACY W/ HCPCS (ALT 636 FOR OP/ED): Performed by: ANESTHESIOLOGIST ASSISTANT

## 2024-05-28 PROCEDURE — 59426 ANTEPARTUM CARE ONLY: CPT | Performed by: STUDENT IN AN ORGANIZED HEALTH CARE EDUCATION/TRAINING PROGRAM

## 2024-05-28 PROCEDURE — 86901 BLOOD TYPING SEROLOGIC RH(D): CPT | Performed by: STUDENT IN AN ORGANIZED HEALTH CARE EDUCATION/TRAINING PROGRAM

## 2024-05-28 PROCEDURE — 01967 NEURAXL LBR ANES VAG DLVR: CPT | Performed by: ANESTHESIOLOGY

## 2024-05-28 PROCEDURE — 3E033VJ INTRODUCTION OF OTHER HORMONE INTO PERIPHERAL VEIN, PERCUTANEOUS APPROACH: ICD-10-PCS | Performed by: STUDENT IN AN ORGANIZED HEALTH CARE EDUCATION/TRAINING PROGRAM

## 2024-05-28 PROCEDURE — 3700000014 EPIDURAL BLOCK: Performed by: ANESTHESIOLOGY

## 2024-05-28 PROCEDURE — 86780 TREPONEMA PALLIDUM: CPT | Performed by: STUDENT IN AN ORGANIZED HEALTH CARE EDUCATION/TRAINING PROGRAM

## 2024-05-28 PROCEDURE — 3700000001 HC GENERAL ANESTHESIA TIME - INITIAL BASE CHARGE: Performed by: ANESTHESIOLOGY

## 2024-05-28 PROCEDURE — 3700000002 HC GENERAL ANESTHESIA TIME - EACH INCREMENTAL 1 MINUTE: Performed by: ANESTHESIOLOGY

## 2024-05-28 PROCEDURE — 36415 COLL VENOUS BLD VENIPUNCTURE: CPT | Performed by: STUDENT IN AN ORGANIZED HEALTH CARE EDUCATION/TRAINING PROGRAM

## 2024-05-28 PROCEDURE — 2500000004 HC RX 250 GENERAL PHARMACY W/ HCPCS (ALT 636 FOR OP/ED): Performed by: STUDENT IN AN ORGANIZED HEALTH CARE EDUCATION/TRAINING PROGRAM

## 2024-05-28 PROCEDURE — 2500000005 HC RX 250 GENERAL PHARMACY W/O HCPCS: Performed by: ANESTHESIOLOGIST ASSISTANT

## 2024-05-28 PROCEDURE — 7210000002 HC LABOR PER HOUR

## 2024-05-28 PROCEDURE — 85027 COMPLETE CBC AUTOMATED: CPT | Performed by: STUDENT IN AN ORGANIZED HEALTH CARE EDUCATION/TRAINING PROGRAM

## 2024-05-28 PROCEDURE — 1120000001 HC OB PRIVATE ROOM DAILY

## 2024-05-28 PROCEDURE — 01967 NEURAXL LBR ANES VAG DLVR: CPT | Performed by: ANESTHESIOLOGIST ASSISTANT

## 2024-05-28 RX ORDER — FENTANYL CITRATE 50 UG/ML
75 INJECTION, SOLUTION INTRAMUSCULAR; INTRAVENOUS ONCE
Status: COMPLETED | OUTPATIENT
Start: 2024-05-28 | End: 2024-05-28

## 2024-05-28 RX ORDER — OXYTOCIN/0.9 % SODIUM CHLORIDE 30/500 ML
2-30 PLASTIC BAG, INJECTION (ML) INTRAVENOUS CONTINUOUS
Status: DISCONTINUED | OUTPATIENT
Start: 2024-05-28 | End: 2024-05-30

## 2024-05-28 RX ORDER — ONDANSETRON HYDROCHLORIDE 2 MG/ML
4 INJECTION, SOLUTION INTRAVENOUS EVERY 6 HOURS PRN
Status: DISCONTINUED | OUTPATIENT
Start: 2024-05-28 | End: 2024-05-30

## 2024-05-28 RX ORDER — LOPERAMIDE HYDROCHLORIDE 2 MG/1
4 CAPSULE ORAL EVERY 2 HOUR PRN
Status: DISCONTINUED | OUTPATIENT
Start: 2024-05-28 | End: 2024-05-30

## 2024-05-28 RX ORDER — MISOPROSTOL 200 UG/1
800 TABLET ORAL ONCE AS NEEDED
Status: DISCONTINUED | OUTPATIENT
Start: 2024-05-28 | End: 2024-05-30 | Stop reason: HOSPADM

## 2024-05-28 RX ORDER — METHYLERGONOVINE MALEATE 0.2 MG/ML
0.2 INJECTION INTRAVENOUS ONCE AS NEEDED
Status: DISCONTINUED | OUTPATIENT
Start: 2024-05-28 | End: 2024-05-30 | Stop reason: HOSPADM

## 2024-05-28 RX ORDER — OXYTOCIN/0.9 % SODIUM CHLORIDE 30/500 ML
60 PLASTIC BAG, INJECTION (ML) INTRAVENOUS ONCE AS NEEDED
Status: DISCONTINUED | OUTPATIENT
Start: 2024-05-28 | End: 2024-05-30 | Stop reason: HOSPADM

## 2024-05-28 RX ORDER — TRANEXAMIC ACID 100 MG/ML
1000 INJECTION, SOLUTION INTRAVENOUS ONCE AS NEEDED
Status: DISCONTINUED | OUTPATIENT
Start: 2024-05-28 | End: 2024-05-30 | Stop reason: HOSPADM

## 2024-05-28 RX ORDER — METOCLOPRAMIDE 10 MG/1
10 TABLET ORAL EVERY 6 HOURS PRN
Status: DISCONTINUED | OUTPATIENT
Start: 2024-05-28 | End: 2024-05-30

## 2024-05-28 RX ORDER — OXYTOCIN 10 [USP'U]/ML
10 INJECTION, SOLUTION INTRAMUSCULAR; INTRAVENOUS ONCE AS NEEDED
Status: DISCONTINUED | OUTPATIENT
Start: 2024-05-28 | End: 2024-05-30 | Stop reason: HOSPADM

## 2024-05-28 RX ORDER — SODIUM CHLORIDE, SODIUM LACTATE, POTASSIUM CHLORIDE, CALCIUM CHLORIDE 600; 310; 30; 20 MG/100ML; MG/100ML; MG/100ML; MG/100ML
125 INJECTION, SOLUTION INTRAVENOUS CONTINUOUS
Status: DISCONTINUED | OUTPATIENT
Start: 2024-05-28 | End: 2024-05-30

## 2024-05-28 RX ORDER — TERBUTALINE SULFATE 1 MG/ML
0.25 INJECTION SUBCUTANEOUS ONCE AS NEEDED
Status: DISCONTINUED | OUTPATIENT
Start: 2024-05-28 | End: 2024-05-30 | Stop reason: HOSPADM

## 2024-05-28 RX ORDER — LABETALOL HYDROCHLORIDE 5 MG/ML
20 INJECTION, SOLUTION INTRAVENOUS ONCE AS NEEDED
Status: DISCONTINUED | OUTPATIENT
Start: 2024-05-28 | End: 2024-05-30 | Stop reason: HOSPADM

## 2024-05-28 RX ORDER — HYDRALAZINE HYDROCHLORIDE 20 MG/ML
5 INJECTION INTRAMUSCULAR; INTRAVENOUS ONCE AS NEEDED
Status: DISCONTINUED | OUTPATIENT
Start: 2024-05-28 | End: 2024-05-30 | Stop reason: HOSPADM

## 2024-05-28 RX ORDER — METOCLOPRAMIDE HYDROCHLORIDE 5 MG/ML
10 INJECTION INTRAMUSCULAR; INTRAVENOUS EVERY 6 HOURS PRN
Status: DISCONTINUED | OUTPATIENT
Start: 2024-05-28 | End: 2024-05-30

## 2024-05-28 RX ORDER — FENTANYL/BUPIVACAINE/NS/PF 2MCG/ML-.1
PLASTIC BAG, INJECTION (ML) INJECTION AS NEEDED
Status: DISCONTINUED | OUTPATIENT
Start: 2024-05-28 | End: 2024-05-30

## 2024-05-28 RX ORDER — NIFEDIPINE 10 MG/1
10 CAPSULE ORAL ONCE AS NEEDED
Status: DISCONTINUED | OUTPATIENT
Start: 2024-05-28 | End: 2024-05-30 | Stop reason: HOSPADM

## 2024-05-28 RX ORDER — ONDANSETRON 4 MG/1
4 TABLET, FILM COATED ORAL EVERY 6 HOURS PRN
Status: DISCONTINUED | OUTPATIENT
Start: 2024-05-28 | End: 2024-05-30

## 2024-05-28 RX ORDER — CARBOPROST TROMETHAMINE 250 UG/ML
250 INJECTION, SOLUTION INTRAMUSCULAR ONCE AS NEEDED
Status: DISCONTINUED | OUTPATIENT
Start: 2024-05-28 | End: 2024-05-30 | Stop reason: HOSPADM

## 2024-05-28 RX ORDER — MORPHINE SULFATE 2 MG/ML
2 INJECTION, SOLUTION INTRAMUSCULAR; INTRAVENOUS ONCE
Status: COMPLETED | OUTPATIENT
Start: 2024-05-28 | End: 2024-05-28

## 2024-05-28 RX ORDER — LIDOCAINE HYDROCHLORIDE 10 MG/ML
30 INJECTION INFILTRATION; PERINEURAL ONCE AS NEEDED
Status: DISCONTINUED | OUTPATIENT
Start: 2024-05-28 | End: 2024-05-30

## 2024-05-28 RX ADMIN — FENTANYL CITRATE 75 MCG: 50 INJECTION, SOLUTION INTRAMUSCULAR; INTRAVENOUS at 19:31

## 2024-05-28 RX ADMIN — FENTANYL CITRATE 2 ML: 50 INJECTION INTRAMUSCULAR; INTRAVENOUS at 23:47

## 2024-05-28 RX ADMIN — EPINEPHRINE 14 ML/HR: 1 INJECTION, SOLUTION, CONCENTRATE INTRAVENOUS at 23:48

## 2024-05-28 RX ADMIN — FENTANYL CITRATE 3 ML: 50 INJECTION INTRAMUSCULAR; INTRAVENOUS at 23:44

## 2024-05-28 RX ADMIN — FENTANYL CITRATE 5 ML: 50 INJECTION INTRAMUSCULAR; INTRAVENOUS at 23:41

## 2024-05-28 RX ADMIN — SODIUM CHLORIDE, POTASSIUM CHLORIDE, SODIUM LACTATE AND CALCIUM CHLORIDE 125 ML/HR: 600; 310; 30; 20 INJECTION, SOLUTION INTRAVENOUS at 19:00

## 2024-05-28 RX ADMIN — MORPHINE SULFATE 2 MG: 2 INJECTION, SOLUTION INTRAMUSCULAR; INTRAVENOUS at 22:19

## 2024-05-28 RX ADMIN — Medication 2 MILLI-UNITS/MIN: at 22:40

## 2024-05-28 SDOH — SOCIAL STABILITY: SOCIAL INSECURITY: ARE THERE ANY APPARENT SIGNS OF INJURIES/BEHAVIORS THAT COULD BE RELATED TO ABUSE/NEGLECT?: NO

## 2024-05-28 SDOH — HEALTH STABILITY: MENTAL HEALTH: NON-SPECIFIC ACTIVE SUICIDAL THOUGHTS (PAST 1 MONTH): NO

## 2024-05-28 SDOH — SOCIAL STABILITY: SOCIAL INSECURITY: VERBAL ABUSE: DENIES

## 2024-05-28 SDOH — SOCIAL STABILITY: SOCIAL INSECURITY: ABUSE SCREEN: ADULT

## 2024-05-28 SDOH — SOCIAL STABILITY: SOCIAL INSECURITY: PHYSICAL ABUSE: DENIES

## 2024-05-28 SDOH — HEALTH STABILITY: MENTAL HEALTH: CURRENT SMOKER: 0

## 2024-05-28 SDOH — SOCIAL STABILITY: SOCIAL INSECURITY: DO YOU FEEL ANYONE HAS EXPLOITED OR TAKEN ADVANTAGE OF YOU FINANCIALLY OR OF YOUR PERSONAL PROPERTY?: NO

## 2024-05-28 SDOH — SOCIAL STABILITY: SOCIAL INSECURITY: HAS ANYONE EVER THREATENED TO HURT YOUR FAMILY OR YOUR PETS?: NO

## 2024-05-28 SDOH — SOCIAL STABILITY: SOCIAL INSECURITY: HAVE YOU HAD THOUGHTS OF HARMING ANYONE ELSE?: NO

## 2024-05-28 SDOH — HEALTH STABILITY: MENTAL HEALTH: SUICIDAL BEHAVIOR (LIFETIME): NO

## 2024-05-28 SDOH — HEALTH STABILITY: MENTAL HEALTH: STRENGTHS (MUST CHOOSE TWO): SUPPORT FROM FAMILY;EDUCATION

## 2024-05-28 SDOH — SOCIAL STABILITY: SOCIAL INSECURITY: ARE YOU OR HAVE YOU BEEN THREATENED OR ABUSED PHYSICALLY, EMOTIONALLY, OR SEXUALLY BY ANYONE?: NO

## 2024-05-28 SDOH — SOCIAL STABILITY: SOCIAL INSECURITY: HAVE YOU HAD ANY THOUGHTS OF HARMING ANYONE ELSE?: NO

## 2024-05-28 SDOH — HEALTH STABILITY: MENTAL HEALTH: WERE YOU ABLE TO COMPLETE ALL THE BEHAVIORAL HEALTH SCREENINGS?: YES

## 2024-05-28 SDOH — ECONOMIC STABILITY: HOUSING INSECURITY: DO YOU FEEL UNSAFE GOING BACK TO THE PLACE WHERE YOU ARE LIVING?: NO

## 2024-05-28 SDOH — HEALTH STABILITY: MENTAL HEALTH: HAVE YOU USED ANY PRESCRIPTION DRUGS OTHER THAN PRESCRIBED IN THE PAST 12 MONTHS?: NO

## 2024-05-28 SDOH — SOCIAL STABILITY: SOCIAL INSECURITY: DOES ANYONE TRY TO KEEP YOU FROM HAVING/CONTACTING OTHER FRIENDS OR DOING THINGS OUTSIDE YOUR HOME?: NO

## 2024-05-28 SDOH — HEALTH STABILITY: MENTAL HEALTH: HAVE YOU USED ANY SUBSTANCES (CANABIS, COCAINE, HEROIN, HALLUCINOGENS, INHALANTS, ETC.) IN THE PAST 12 MONTHS?: NO

## 2024-05-28 SDOH — HEALTH STABILITY: MENTAL HEALTH: WISH TO BE DEAD (PAST 1 MONTH): NO

## 2024-05-28 ASSESSMENT — PAIN SCALES - GENERAL
PAINLEVEL_OUTOF10: 0 - NO PAIN
PAINLEVEL_OUTOF10: 10 - WORST POSSIBLE PAIN
PAINLEVEL_OUTOF10: 0 - NO PAIN

## 2024-05-28 ASSESSMENT — LIFESTYLE VARIABLES
HOW MANY STANDARD DRINKS CONTAINING ALCOHOL DO YOU HAVE ON A TYPICAL DAY: PATIENT DOES NOT DRINK
HOW OFTEN DO YOU HAVE A DRINK CONTAINING ALCOHOL: NEVER
AUDIT-C TOTAL SCORE: 0
SKIP TO QUESTIONS 9-10: 1
HOW OFTEN DO YOU HAVE 6 OR MORE DRINKS ON ONE OCCASION: NEVER
AUDIT-C TOTAL SCORE: 0

## 2024-05-28 ASSESSMENT — ENCOUNTER SYMPTOMS
DEPRESSION: 0
LOSS OF SENSATION IN FEET: 0
OCCASIONAL FEELINGS OF UNSTEADINESS: 0

## 2024-05-28 ASSESSMENT — ACTIVITIES OF DAILY LIVING (ADL): LACK_OF_TRANSPORTATION: NO

## 2024-05-28 NOTE — SIGNIFICANT EVENT
Labor Check    Subjective:   Seen at bedside in NAD. Agreeable to CRB placement    Objective:  Fetal Assessment  Movement: Present  Mode: External US  Baseline Fetal Heart Rate (bpm): 135 bpm  Baseline Classification: Normal  Variability: Moderate (Between 6 and 25 BPM)  Pattern: Accelerations  FHR Category: Category I      Contraction Frequency: 2-4    Patient was placed in dorsal lithotomy position, a speculum was placed, and a cervical ripening balloon was guided through the external and internal cervical os with a ring forceps. The balloon was inflated with 60cc of normal saline. Pt tolerated the procedure well.    A/P:  - Latent labor, continue to monitor for cervical change  - Holding off on Cytotec given frequent contractions  - CEFM, currently Category I    D/w with. Dr. Robin. CRB placed by Dr. Robin.     Tamika Gunn, MS4  Labor & Delivery    Andrew Robin MD  Reviewed MS4 note and made edits within text.

## 2024-05-28 NOTE — H&P
Obstetrical Admission History and Physical     Lavonne Booth is a 33 y.o.  @ 37w3d. Estimated Date of Delivery: 6/15/24. Estimated fetal weight: 6#8. Prenatal care: Tout    Chief Complaint: IOL    Assessment/Plan    33 y.o.  @ 37w3d by LMP cw 12w2d us presenting for induction of labor for  decreased fetal movement at term .     IOL  Options for delivery have been discussed with the patient and she elects for an induction of labor. Cervical ripening with cytotec, cervidil, other prostaglandin agents has been discussed.  Induction of labor with pitocin, amniotomy, cytotec, and cervical balloon have been discussed in detail. The risks, benefits, complications, alternatives, expected outcomes, potential problems during recuperation and recovery, and the risks of not performing the procedure were discussed with the patient. The patient stated understanding that the risks of delivery include, but are not limited to: death; reaction to medications; injury to bowel, bladder, ureters, uterus, cervix, vagina, and other pelvic and abdominal structures, infection; blood loss and possible need for transfusion; and potential need for surgery, including hysterectomy. The risks of injury to the infant during delivery were also discussed. All questions were answered. There was concurrence with the planned procedure, and the patient wanted to proceed.    Admit to inpatient status. I anticipate that this patient will require a stay exceeding at least 2 midnights for delivery and postpartum.  Induction of labor with cervical ripening balloon and misoprostol  Management of pregnancy complications, as indicated.    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 Nelson Robin MD  Labor and Delivery    Subjective   Good fetal movement. Denies vaginal bleeding.     32 yo G1 @ 37w3d presented to office for decreased fetal movement with subtle late decelerations on NST, admitted for IOL.     Obstetrical History   OB History    Para Term  AB Living   1             SAB IAB Ectopic Multiple Live Births                  # Outcome Date GA Lbr Jasson/2nd Weight Sex Delivery Anes PTL Lv   1 Current              Past Medical History  Past Medical History:   Diagnosis Date    Dizziness 11/10/2023    Dysmenorrhea 2021    Fibrocystic breast 2021    Need for immunization against influenza 11/10/2023    S/p flu 11/10/023    PMS (premenstrual syndrome) 2021      Past Surgical History   Past Surgical History:   Procedure Laterality Date    EYE SURGERY       Medications  Medications Prior to Admission   Medication Sig Dispense Refill Last Dose    cyclobenzaprine (Flexeril) 10 mg tablet Take 1 tablet (10 mg) by mouth 3 times a day as needed for muscle spasms. 20 tablet 0     mupirocin (Bactroban) 2 % cream Apply topically 3 times a day. 30 g 2     prenatal vitamin, iron-folic, (prenatal vit no.130-iron-folic) 27 mg iron-800 mcg folic acid tablet Take 1 tablet by mouth once daily. 90 tablet 3     valACYclovir (Valtrex) 500 mg tablet Take 1 tablet (500 mg) by mouth twice daily for 3 days. 6 tablet 11      Allergies  Azithromycin     Family History  No family history on file.    Social History  Social History     Tobacco Use    Smoking status: Former    Smokeless tobacco: Never    Tobacco comments:     Pt used vape before pregnancy    Substance Use Topics    Alcohol use: Not Currently     Comment: occasional     Substance and Sexual Activity   Drug Use Never     Objective    Last Vitals  Temp Pulse Resp BP MAP O2 Sat   36.5 °C (97.7 °F)  99 18 122/74   96 %     Physical Examination  General: Examination reveals a well developed, well nourished, female, in no acute distress. She is alert and cooperative.  HEENT: External ears normal. Nose normal, no erythema or discharge. Mouth and throat clear.  Neck: supple, no significant adenopathy.  Lungs: normal effort.  Cardiac: regular rate.  Abdomen: gravid  Extremities: no limitation in range of motion.  Neurological: alert, oriented, normal speech, no focal findings or movement disorder noted.  Psychological: awake and alert; oriented to person, place, and time.    NST  120/Moderate/+accels/-decels    BSUS  Cephalic    Lab Review  Labs in chart were reviewed.

## 2024-05-28 NOTE — PROGRESS NOTES
Subjective   Patient ID [id number]   Lavonne Booth is a 33 y.o.  at 37w3d with a working estimated date of delivery of 6/15/2024, by Last Menstrual Period who presents for a routine prenatal visit. She denies vaginal bleeding, leakage of fluid, or contractions. Having decreased fetal movements - baby does move intermittently throughout the day in the last few days, but before that movement was almost constant.    Her pregnancy is complicated by:  - elevated BP without dx htn (2 mild range on BP cuff at work, not 4 hrs apart, at 25 wga)  - nasal HSV infection, no history of genital disease  - mild intermittent asthma    Objective   Physical Exam:   Weight: 80.7 kg (178 lb)  Expected Total Weight Gain: 11.5 kg (25 lb)-16 kg (35 lb)   [place] BMI: 23.49  BP: 121/80    Fundal Height (cm): 37 cm    Dilation: Closed Effacement (%): 70 Fetal Station: -2    Non-Stress Test   Baseline Fetal Heart Rate for Non-Stress Test: 115 BPM  Variability in Waveform for Non-Stress Test: Moderate  Accelerations in Non-Stress Test: Yes, greater than/equal to 15 bpm, lasting at least 15 seconds  Decelerations in Non-Stress Test: (!) Late  Contractions in Non-Stress Test: Regular  NST Contraction Frequency: q 2-3 mins  Acoustic Stimulator for Non-Stress Test: No  Interpretation of Non-Stress Test   Comments on Non-Stress Test: given presence of late decelerations, patient to proceed to L&D for IOL                               Assessment/Plan   Given subjectively decreased fetal movement and lates on NST, recommend IOL which she accepts.  Report called to L&D team.    Elli Villar MD

## 2024-05-29 LAB
ALBUMIN SERPL BCP-MCNC: 3.1 G/DL (ref 3.4–5)
ALP SERPL-CCNC: 78 U/L (ref 33–110)
ALT SERPL W P-5'-P-CCNC: 6 U/L (ref 7–45)
ANION GAP SERPL CALC-SCNC: 12 MMOL/L (ref 10–20)
AST SERPL W P-5'-P-CCNC: 14 U/L (ref 9–39)
BASOPHILS # BLD AUTO: 0.04 X10*3/UL (ref 0–0.1)
BASOPHILS NFR BLD AUTO: 0.3 %
BILIRUB SERPL-MCNC: 0.5 MG/DL (ref 0–1.2)
BUN SERPL-MCNC: 16 MG/DL (ref 6–23)
CALCIUM SERPL-MCNC: 8.5 MG/DL (ref 8.6–10.6)
CHLORIDE SERPL-SCNC: 106 MMOL/L (ref 98–107)
CO2 SERPL-SCNC: 20 MMOL/L (ref 21–32)
CREAT SERPL-MCNC: 0.82 MG/DL (ref 0.5–1.05)
EGFRCR SERPLBLD CKD-EPI 2021: >90 ML/MIN/1.73M*2
EOSINOPHIL # BLD AUTO: 0.03 X10*3/UL (ref 0–0.7)
EOSINOPHIL NFR BLD AUTO: 0.2 %
ERYTHROCYTE [DISTWIDTH] IN BLOOD BY AUTOMATED COUNT: 11.9 % (ref 11.5–14.5)
GLUCOSE SERPL-MCNC: 102 MG/DL (ref 74–99)
HCT VFR BLD AUTO: 34.1 % (ref 36–46)
HGB BLD-MCNC: 11.4 G/DL (ref 12–16)
IMM GRANULOCYTES # BLD AUTO: 0.14 X10*3/UL (ref 0–0.7)
IMM GRANULOCYTES NFR BLD AUTO: 0.9 % (ref 0–0.9)
LACTATE SERPL-SCNC: 1 MMOL/L (ref 0.4–2)
LYMPHOCYTES # BLD AUTO: 1.5 X10*3/UL (ref 1.2–4.8)
LYMPHOCYTES NFR BLD AUTO: 9.8 %
MCH RBC QN AUTO: 32 PG (ref 26–34)
MCHC RBC AUTO-ENTMCNC: 33.4 G/DL (ref 32–36)
MCV RBC AUTO: 96 FL (ref 80–100)
MONOCYTES # BLD AUTO: 1.16 X10*3/UL (ref 0.1–1)
MONOCYTES NFR BLD AUTO: 7.6 %
NEUTROPHILS # BLD AUTO: 12.38 X10*3/UL (ref 1.2–7.7)
NEUTROPHILS NFR BLD AUTO: 81.2 %
NRBC BLD-RTO: 0 /100 WBCS (ref 0–0)
PLATELET # BLD AUTO: 198 X10*3/UL (ref 150–450)
POTASSIUM SERPL-SCNC: 4.1 MMOL/L (ref 3.5–5.3)
PROT SERPL-MCNC: 5.5 G/DL (ref 6.4–8.2)
RBC # BLD AUTO: 3.56 X10*6/UL (ref 4–5.2)
SODIUM SERPL-SCNC: 134 MMOL/L (ref 136–145)
TREPONEMA PALLIDUM IGG+IGM AB [PRESENCE] IN SERUM OR PLASMA BY IMMUNOASSAY: NONREACTIVE
WBC # BLD AUTO: 15.3 X10*3/UL (ref 4.4–11.3)

## 2024-05-29 PROCEDURE — 2500000004 HC RX 250 GENERAL PHARMACY W/ HCPCS (ALT 636 FOR OP/ED): Performed by: ANESTHESIOLOGIST ASSISTANT

## 2024-05-29 PROCEDURE — 85025 COMPLETE CBC W/AUTO DIFF WBC: CPT | Performed by: STUDENT IN AN ORGANIZED HEALTH CARE EDUCATION/TRAINING PROGRAM

## 2024-05-29 PROCEDURE — 80053 COMPREHEN METABOLIC PANEL: CPT | Performed by: STUDENT IN AN ORGANIZED HEALTH CARE EDUCATION/TRAINING PROGRAM

## 2024-05-29 PROCEDURE — 7210000002 HC LABOR PER HOUR

## 2024-05-29 PROCEDURE — 51701 INSERT BLADDER CATHETER: CPT

## 2024-05-29 PROCEDURE — 2500000004 HC RX 250 GENERAL PHARMACY W/ HCPCS (ALT 636 FOR OP/ED): Performed by: STUDENT IN AN ORGANIZED HEALTH CARE EDUCATION/TRAINING PROGRAM

## 2024-05-29 PROCEDURE — 10907ZC DRAINAGE OF AMNIOTIC FLUID, THERAPEUTIC FROM PRODUCTS OF CONCEPTION, VIA NATURAL OR ARTIFICIAL OPENING: ICD-10-PCS | Performed by: STUDENT IN AN ORGANIZED HEALTH CARE EDUCATION/TRAINING PROGRAM

## 2024-05-29 PROCEDURE — 59050 FETAL MONITOR W/REPORT: CPT

## 2024-05-29 PROCEDURE — 83605 ASSAY OF LACTIC ACID: CPT | Performed by: STUDENT IN AN ORGANIZED HEALTH CARE EDUCATION/TRAINING PROGRAM

## 2024-05-29 PROCEDURE — 1120000001 HC OB PRIVATE ROOM DAILY

## 2024-05-29 RX ORDER — FENTANYL/BUPIVACAINE/NS/PF 2MCG/ML-.1
PLASTIC BAG, INJECTION (ML) INJECTION CONTINUOUS PRN
Status: DISCONTINUED | OUTPATIENT
Start: 2024-05-29 | End: 2024-05-29

## 2024-05-29 RX ORDER — ACETAMINOPHEN 325 MG/1
975 TABLET ORAL ONCE
Status: COMPLETED | OUTPATIENT
Start: 2024-05-29 | End: 2024-05-29

## 2024-05-29 RX ADMIN — EPINEPHRINE 14 ML/HR: 1 INJECTION, SOLUTION, CONCENTRATE INTRAVENOUS at 09:23

## 2024-05-29 RX ADMIN — SODIUM CHLORIDE, POTASSIUM CHLORIDE, SODIUM LACTATE AND CALCIUM CHLORIDE 125 ML/HR: 600; 310; 30; 20 INJECTION, SOLUTION INTRAVENOUS at 14:42

## 2024-05-29 RX ADMIN — ACETAMINOPHEN 975 MG: 325 TABLET ORAL at 18:07

## 2024-05-29 RX ADMIN — SODIUM CHLORIDE, POTASSIUM CHLORIDE, SODIUM LACTATE AND CALCIUM CHLORIDE 500 ML: 600; 310; 30; 20 INJECTION, SOLUTION INTRAVENOUS at 09:34

## 2024-05-29 RX ADMIN — SODIUM CHLORIDE, SODIUM LACTATE, POTASSIUM CHLORIDE, AND CALCIUM CHLORIDE 500 ML: 600; 310; 30; 20 INJECTION, SOLUTION INTRAVENOUS at 04:00

## 2024-05-29 RX ADMIN — Medication 2 MILLI-UNITS/MIN: at 05:58

## 2024-05-29 RX ADMIN — GENTAMICIN SULFATE 400 MG: 40 INJECTION, SOLUTION INTRAMUSCULAR; INTRAVENOUS at 20:05

## 2024-05-29 RX ADMIN — EPINEPHRINE 14 ML/HR: 1 INJECTION, SOLUTION, CONCENTRATE INTRAVENOUS at 18:06

## 2024-05-29 RX ADMIN — AMPICILLIN 2 G: 2 INJECTION, POWDER, FOR SOLUTION INTRAVENOUS at 19:18

## 2024-05-29 ASSESSMENT — PAIN SCALES - GENERAL
PAINLEVEL_OUTOF10: 0 - NO PAIN
PAINLEVEL_OUTOF10: 4
PAINLEVEL_OUTOF10: 0 - NO PAIN
PAINLEVEL_OUTOF10: 4
PAINLEVEL_OUTOF10: 0 - NO PAIN

## 2024-05-29 ASSESSMENT — PAIN DESCRIPTION - ORIENTATION: ORIENTATION: ANTERIOR

## 2024-05-29 ASSESSMENT — PAIN DESCRIPTION - LOCATION: LOCATION: HEAD

## 2024-05-29 ASSESSMENT — PAIN - FUNCTIONAL ASSESSMENT: PAIN_FUNCTIONAL_ASSESSMENT: 0-10

## 2024-05-29 NOTE — ANESTHESIA PREPROCEDURE EVALUATION
Patient: Lavonne Booth    Evaluation Method: In-person visit    Procedure Information    Date: 24  Procedure: Labor Consult       33 y.o.  at 37w3d    Relevant Problems   Anesthesia   (+) PONV (postoperative nausea and vomiting)      Cardiac (within normal limits)      Pulmonary   (+) Mild intermittent asthma without complication ([facility])      Neuro (within normal limits)      GI (within normal limits)      /Renal (within normal limits)      Endocrine (within normal limits)      Hematology (within normal limits)      ID   (+) 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)      GYN   (+) 37 weeks gestation of pregnancy ([facility])     Past Surgical History:   Procedure Laterality Date   • EYE SURGERY         Clinical information reviewed:   Tobacco  Allergies  Meds   Med Hx  Surg Hx   Fam Hx  Soc Hx        NPO Detail:  No data recorded    Results for orders placed or performed during the hospital encounter of 24 (from the past 24 hour(s))   Type And Screen   Result Value Ref Range    ABO TYPE O     Rh TYPE POS     ANTIBODY SCREEN NEG    CBC   Result Value Ref Range    WBC 11.7 (H) 4.4 - 11.3 x10*3/uL    nRBC 0.0 0.0 - 0.0 /100 WBCs    RBC 3.95 (L) 4.00 - 5.20 x10*6/uL    Hemoglobin 12.5 12.0 - 16.0 g/dL    Hematocrit 37.0 36.0 - 46.0 %    MCV 94 80 - 100 fL    MCH 31.6 26.0 - 34.0 pg    MCHC 33.8 32.0 - 36.0 g/dL    RDW 11.7 11.5 - 14.5 %    Platelets 215 150 - 450 x10*3/uL         OB/Gyn Evaluation    Present Pregnancy    Patient is pregnant now.   Obstetric History            Physical Exam    Airway  Mallampati: III  TM distance: >3 FB  Neck ROM: full     Cardiovascular - normal exam  Rhythm: regular  Rate: normal     Dental    Pulmonary - normal exam     Abdominal          Anesthesia Plan    History of general anesthesia?: yes  History of complications of general anesthesia?: yes    ASA 2     epidural     The patient is not a current smoker.  Patient was not previously  instructed to abstain from smoking on day of procedure.    Anesthetic plan and risks discussed with patient.    Plan discussed with resident and CAA.

## 2024-05-29 NOTE — ANESTHESIA PROCEDURE NOTES
Epidural Block    Patient location during procedure: OB  Start time: 5/28/2024 11:09 PM  End time: 5/28/2024 11:39 PM  Reason for block: labor analgesia  Staffing  Performed: MECCA   Authorized by: Harvey Blandon MD    Performed by: MECCA Melendez    Preanesthetic Checklist  Completed: patient identified, IV checked, risks and benefits discussed, surgical consent, pre-op evaluation, timeout performed and sterile techniques followed  Block Timeout  RN/Licensed healthcare professional reads aloud to the Anesthesia provider and entire team: Patient identity, procedure with side and site, patient position, and as applicable the availability of implants/special equipment/special requirements.  Patient on coagulant treatment: no  Timeout performed at: 5/28/2024 11:09 PM  Block Placement  Patient position: sitting  Prep: ChloraPrep  Sterility prep: cap, drape, gloves and mask  Sedation level: no sedation  Patient monitoring: blood pressure, continuous pulse oximetry and heart rate  Approach: midline  Local numbing: lidocaine 1% to skin and subcutaneous tissues  Vertebral space: lumbar  Lumbar location: L3-L4  Epidural  Loss of resistance technique: saline  Guidance: landmark technique        Needle  Needle type: Tuohy   Needle gauge: 17  Needle length: 8.9cm  Needle insertion depth: 5 cm  Catheter type: multi-orifice  Catheter size: 19 G  Catheter at skin depth: 10 cm  Catheter securement method: clear occlusive dressing and liquid medical adhesive    Test dose: lidocaine 1.5% with epinephrine 1-to-200,000  Test dose: lidocaine 1.5% with epinephrine 1-to-200,000  Test dose result: no positive test dose    PCEA  Medication concentration used: 0.044% Bupivacaine with 1.25 mcg/mL Fentanyl and [id number] Epinephrine  Dose (mL): 10  Lockout (minutes): 15  1-Hour Limit (boluses/hr): 54  Basal Rate: 14        Assessment  Events: no positive test dose  Procedure assessment: patient tolerated procedure well with no immediate  complications

## 2024-05-29 NOTE — SIGNIFICANT EVENT
AROM    Subjective:   Seen at bedside in NAD. Epidural infusing. Agreeable to AROM.    Objective:  Cervical Exam  Dilation: 4  Effacement (%): 70  Fetal Station: -2  OB Examiner: Sharda  Fetal Assessment  Movement: Present  Mode: External US  Baseline Fetal Heart Rate (bpm): 135 bpm  Baseline Classification: Normal  Variability: Moderate (Between 6 and 25 BPM)  Pattern: Accelerations  FHR Category: Category I  Multiple Births: No      Contraction Frequency: 1.5-5    A/P:  - Latent labor  - AROM with clear fluid  - Continue pitocin per protocol, currently at 2  - CEFM, currently Category I    D/w with. Dr. Robin.    Tamika Gunn, MS4  Labor & Delivery    Andrew Robin MD  Labor and Delivery    Reviewed MS4 note and made edits within text

## 2024-05-29 NOTE — CARE PLAN
The patient's goals for the shift include     The clinical goals for the shift include FHT remains reassuring, healthy mom et baby      Problem: Vaginal Birth or  Section  Goal: Fetal and maternal status remain reassuring during the birth process  Outcome: Progressing  Goal: Tolerate CRB for IOL placement maintenance until dislodgement/removal 12hrs after placement  Outcome: Met  Goal: Demonstrates labor coping techniques through delivery  Outcome: Progressing  Goal: Minimal s/sx of HDP and BP<160/110  Outcome: Progressing  Goal: Prevention of malpresentation/labor dystocia through delivery  Outcome: Progressing  Goal: No s/sx of infection through recovery  Outcome: Progressing  Goal: No s/sx of hemorrhage through recovery  Outcome: Progressing     Problem: Pain - Adult  Goal: Verbalizes/displays adequate comfort level or baseline comfort level  Outcome: Progressing     Problem: Safety - Adult  Goal: Free from fall injury  Outcome: Progressing     Problem: Discharge Planning  Goal: Discharge to home or other facility with appropriate resources  Outcome: Progressing

## 2024-05-29 NOTE — ANESTHESIA PREPROCEDURE EVALUATION
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   Obstetric History            Physical Exam    Airway  Mallampati: III  TM distance: >3 FB  Neck ROM: full     Cardiovascular - normal exam  Rhythm: regular  Rate: normal     Dental    Pulmonary - normal exam     Abdominal          Anesthesia Plan    History of general anesthesia?: yes  History of complications of general anesthesia?: yes    ASA 2     epidural     The patient is not a current smoker.  Patient was not previously instructed to abstain from smoking on day of procedure.    Anesthetic plan and risks discussed with patient.    Plan discussed with resident.

## 2024-05-29 NOTE — SIGNIFICANT EVENT
CEFM Cat II for variable/late decelerations. Overall reassuring with moderate variability. SVE unchanged (4/70/-2). DC Pit. IVF bolus. Continue PRN position changes. Restart Pitocin as indicated.    Discussed with Dr. Nelson Robin MD   Labor and Delivery

## 2024-05-29 NOTE — SIGNIFICANT EVENT
Patient re-assessment for hypotension:  Attending to bedside for RN report of patient with BP values 80s/40s, transient SpO2 < 95%, and intermittent tachycardia to 110s.    On arrival, patient in recumbent position, and reports feeling tired but otherwise not unwell.  Denies lightheadedness or subjective fever, shortness of breath, or other ill symptoms.    On exam, appears mildly fatigued and diaphoretic but not toxic appearing.  T repeated at this time and is 37.7 C.  Lungs CTAB throughout  HR WNL on auscultation with no aberrant sounds  Abdomen gravid, non fundal tenderness but with epidural anesthesia infusing  Bilateral LE overall with minimal edema, faint 1+ pitting on right to mid-shin     with moderate variability and accelerations present  [place] with contractions q 1-4 mins  SVE deferred  Pit infusing at 2 mU/min    Intake and output:  Urine 500 mL since arrival (patient affirms she voided once and straight cath recorded once overnight)  IV fluids 2597.3 mL, since admission  Total fluid balance 2L net positive    Concern for possible developing IAI based on hypotension, tachcyardia, and increasing temperature, however she has not yet met criteria for IAI or sepsis.  Will obtain CBC with diff, CMP, and lactate to assess for any evidence of end-organ dysfunction however at this time will defer antibiotics at this time.  Will administer 500 mL bolus for hypotension given low UOP since admission and exam without evidence of fluid overload despite positive fluid balance since arrival.    Continue close observation.    Elli Villar MD

## 2024-05-29 NOTE — SIGNIFICANT EVENT
Labor Check    Subjective:   Seen at bedside in NAD. Epidural infusing. Endorsing some pelvic pressure.    Objective:  Cervical Exam  Dilation: 4  Effacement (%): 70  Fetal Station: -2  OB Examiner: Sharda  Fetal Assessment  Movement: Present  Mode: External US  Baseline Fetal Heart Rate (bpm): 160 bpm  Baseline Classification: Normal  Variability: Moderate (Between 6 and 25 BPM)  Pattern: Accelerations  FHR Category: Category I  Multiple Births: No      Contraction Frequency: 1.5-3    A/P:  - Latent labor  - Continue pitocin per protocol, currently at 8  - CEFM, currently Category I    D/w with. Dr. Robin.    Tamika Gunn, MS4  Labor & Delivery    Reviewed MS4 note and made edits within text.

## 2024-05-29 NOTE — SIGNIFICANT EVENT
Labor Check    Subjective:   Seen at bedside in NAD. Epidural infusing.     Objective:  Cervical Exam  Dilation: 4  Effacement (%): 70  Fetal Station: -2  OB Examiner: Sharda  Fetal Assessment  Movement: Present  Mode: External US  Baseline Fetal Heart Rate (bpm): 140 bpm  Baseline Classification: Normal  Variability: Moderate (Between 6 and 25 BPM)  Pattern: Variable decelerations  FHR Category: Category I  Multiple Births: No      Contraction Frequency: 2-4    A/P:  - SVE unchanged from prior, patient at total 10hours pit/AROM time. Discussed that can offer recheck at 12 and 18hrs to evaluate labor progression. Discussed that with prolonged ROM and exposure to pitocin, the risk of maternal and fetal infection and maternal hemorrhage increases, respectively. Pt expressed understanding and wishes to proceed with attempt at vaginal birth.   - Continue pitocin per protocol, Pit off once for lates overnight.  - CEFM, currently Category 1-2 for recurrent variables    D/w with. Dr. Villar.    Rubina Lindsey MD  PGY-1, Labor and Delivery

## 2024-05-29 NOTE — PROGRESS NOTES
Intrapartum Progress Note    Assessment/Plan   Lavonne Booth is a 33 y.o.  at 37w4d by LMP c/w 12w2d US admitted for induction of labor in s/o nonreassuring  testing at term.     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) 2024 by Elli Villar MD No    Overview Signed 2024  6:00 PM by Elli Villar MD     - intranasal HSV diagnosed in triage 3/21 however patient had been experiencing recurrent intranasal lesions for years, approximately 4 times per year or fewer  - no history of HSV affecting the genitalia  - counseled that direct contact with the lesion can result in transmission and to take care with hand hygiene with  however no risk of intrapartum transmission         Elevated blood pressure reading without diagnosis of hypertension 3/6/2024 by  Rubina Wheeler MD No    Overview Addendum 3/6/2024  4:38 PM by Rubina Wheeler MD     2x mild range Bps at work (hospital cuff) at 25wga  HELLP labs wnl 3/6, P:C too low to calculate         37 weeks gestation of pregnancy ([facility]) 2024 by Rocio Florian, APRN-CNP No    Prenatal care in third trimester ([facility]) 11/10/2023 by Jose Elias Mendoza MD No    Overview Addendum 2024  4:23 PM by Mehreen Lynn MD     [x] Initial BMI: 23  [x] Dating: LMP 23  [x] Prenatal Labs: ordered  [x] Genetic Screening:  risk-reducing cfDNA; NT done   [x] Baby ASA: not indicated  [x] Anatomy US: unremarkable  [x] 1hr GCT at 24-28wks:  [x] Tdap (27-36wks):  [x] Flu Shot: 11/10/23  [x] COVID vaccine: declined  [x] GBS at 36 wks: collected   [x] Feeding: breast, working on picking out a pump  [] Postpartum Birth control method: undecided  [x] 39 weeks discussion of IOL vs. Expectant management: undecided, will readdress   [x] Mode of delivery:  anticipate          Nausea and vomiting in pregnancy prior to 22 weeks gestation ([facility]) 11/10/2023 by Jose Elias Mendoza MD 4/15/2024 by Elli Villar MD            Subjective   Lavonne Booth is a 33 y.o.  at 37w4d by LMP c/w 12w2d US admitted for induction of labor in s/o decreased fetal movement at term. Pt resting comfortably in bed. Reports good pain control with epidural.     Objective   Last Vitals:  Temp Pulse Resp BP MAP Pulse Ox   37.1 °C (98.8 °F) 78 18 104/56   (!) 94 %     Vitals Min/Max Last 24 Hours:  Temp  Min: 36.5 °C (97.7 °F)  Max: 37.3 °C (99.1 °F)  Pulse  Min: 78  Max: 116  Resp  Min: 16  Max: 18  BP  Min: 88/50  Max: 134/73    Intake/Output:    Intake/Output Summary (Last 24 hours) at 2024 0749  Last data filed at 2024 0629  Gross per 24 hour   Intake --   Output 500 ml   Net -500 ml       Physical Examination:  GENERAL: Examination reveals a well developed, well nourished, gravid female in no acute distress. She is  "alert and cooperative.  LUNGS:  breathing comfortably on room air  FHR: baseline 145bpm, moderate variability, no accelerations, no decelerations   [place] reading: contractions q2-3 mins     Lab Review:  Lab Results   Component Value Date    ABO O 05/28/2024    LABRH POS 05/28/2024    ABSCRN NEG 05/28/2024     Lab Results   Component Value Date    WBC 11.7 (H) 05/28/2024    HGB 12.5 05/28/2024    HCT 37.0 05/28/2024     05/28/2024     No results found for: \"GRPBSTREP\"  No results found for: \"GLUCOSE\", \"NA\", \"K\", \"CL\", \"CO2\", \"ANIONGAP\", \"BUN\", \"CREATININE\", \"EGFR\", \"CALCIUM\", \"ALBUMIN\", \"PROT\", \"ALKPHOS\", \"ALT\", \"AST\", \"BILITOT\"  No results found for: \"UTPCR\"  "

## 2024-05-29 NOTE — SIGNIFICANT EVENT
Labor Check    Subjective:   Seen at bedside in NAD. Endorsing contractions.    Objective:  Fetal Assessment  Movement: Present  Mode: External US  Baseline Fetal Heart Rate (bpm): 130 bpm  Baseline Classification: Normal  Variability: Moderate (Between 6 and 25 BPM)  Pattern: Accelerations  FHR Category: Category I  Multiple Births: No      Contraction Frequency: 1.5-3    Cervix: CRB in place, cervix more effaced    A/P:  - Latent labor  - Start pitocin and up titrate per protocol given contractions are spacing out  - CEFM, currently Category I  - IV morphine 2mg for pain management    D/w with. Dr. Robin.    Tamika Gunn, MS4  Labor & Delivery

## 2024-05-29 NOTE — SIGNIFICANT EVENT
Labor Check    Subjective:   Seen at bedside in NAD. Epidural infusing. Endorsing pelvic pressure.    Objective:  Cervix: 9.5/100/0  FHT: 140bpm, mod fiona, + accels  [place]: Q2-3 mins  Temp: 38.4  BP: 120/74  Pulse:   SpO2: 94-97    A/P:  - Active labor  - Continue pitocin per protocol, currently at 6  - CEFM, currently Category I  - Given one elevated temperature and prolonged rupture time (> 18 hours), will treat for presumptive IAI with ampicillin and gentamicin. PRN Tylenol for fever. FHR and maternal HR wnl.    D/w with. Dr. Robin.    Tamika Gunn, MS4  Labor & Delivery    Andrew Robin MD  Reviewed MS4 note and made edits within text.

## 2024-05-29 NOTE — PROGRESS NOTES
Intrapartum Progress Note    Assessment/Plan   Lavonne Booth is a 33 y.o.  at 37w4d by LMP c/w 12w2d US undergoing IOL in the setting of decreased fetal movement at term.     Labor Management  - Latent labor s/p AROM @ 0015. 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 Robin.    Tamika Gunn, MS4    Andrew Robin MD  Labor and Delivery    Subjective   Patient seen at bedside. Endorsing intermittent pelvic pressure associated with contractions.    Objective   Last Vitals:  Temp Pulse Resp BP MAP Pulse Ox   37.8 °C (100 °F) 89 18 121/75   96 %     Vitals Min/Max Last 24 Hours:  Temp  Min: 36.7 °C (98.1 °F)  Max: 37.8 °C (100 °F)  Pulse  Min: 73  Max: 121  Resp  Min: 16  Max: 18  BP  Min: 81/45  Max: 134/73    Intake/Output:    Intake/Output Summary (Last 24 hours) at 2024 1749  Last data filed at 2024 1500  Gross per 24 hour   Intake 3382.58 ml   Output 1650 ml   Net 1732.58 ml     Physical Examination:  Constitutional: No visible distress, alert and cooperative  Respiratory/Thorax: Normal respiratory effort on RA  Cardiovascular: Reg rate  Gastrointestinal: soft, nondistended, nontender, gravid  Neurological: A&Ox3  Psychological: Appropriate mood and behavior  Cervix: 4/70/-2  FHT: 130, mod fiona, + accels, - decels  [place]: q3-4 mins    Lab Review:  Lab Results   Component Value Date    ABO O 2024    LABRH POS 2024    ABSCRN NEG 2024     Lab Results   Component Value Date    WBC 15.3 (H)  05/29/2024    HGB 11.4 (L) 05/29/2024    HCT 34.1 (L) 05/29/2024     05/29/2024

## 2024-05-30 PROCEDURE — 1100000001 HC PRIVATE ROOM DAILY

## 2024-05-30 PROCEDURE — 88307 TISSUE EXAM BY PATHOLOGIST: CPT | Mod: TC,SUR | Performed by: STUDENT IN AN ORGANIZED HEALTH CARE EDUCATION/TRAINING PROGRAM

## 2024-05-30 PROCEDURE — 0KQM0ZZ REPAIR PERINEUM MUSCLE, OPEN APPROACH: ICD-10-PCS | Performed by: STUDENT IN AN ORGANIZED HEALTH CARE EDUCATION/TRAINING PROGRAM

## 2024-05-30 PROCEDURE — 59410 OBSTETRICAL CARE: CPT | Performed by: STUDENT IN AN ORGANIZED HEALTH CARE EDUCATION/TRAINING PROGRAM

## 2024-05-30 PROCEDURE — 2500000005 HC RX 250 GENERAL PHARMACY W/O HCPCS: Performed by: STUDENT IN AN ORGANIZED HEALTH CARE EDUCATION/TRAINING PROGRAM

## 2024-05-30 PROCEDURE — 2500000004 HC RX 250 GENERAL PHARMACY W/ HCPCS (ALT 636 FOR OP/ED): Performed by: STUDENT IN AN ORGANIZED HEALTH CARE EDUCATION/TRAINING PROGRAM

## 2024-05-30 PROCEDURE — 59409 OBSTETRICAL CARE: CPT | Performed by: OBSTETRICS & GYNECOLOGY

## 2024-05-30 PROCEDURE — 7100000016 HC LABOR RECOVERY PER HOUR

## 2024-05-30 PROCEDURE — 2500000001 HC RX 250 WO HCPCS SELF ADMINISTERED DRUGS (ALT 637 FOR MEDICARE OP): Performed by: STUDENT IN AN ORGANIZED HEALTH CARE EDUCATION/TRAINING PROGRAM

## 2024-05-30 RX ORDER — LOPERAMIDE HYDROCHLORIDE 2 MG/1
4 CAPSULE ORAL EVERY 2 HOUR PRN
Status: DISCONTINUED | OUTPATIENT
Start: 2024-05-30 | End: 2024-06-03 | Stop reason: HOSPADM

## 2024-05-30 RX ORDER — OXYTOCIN 10 [USP'U]/ML
10 INJECTION, SOLUTION INTRAMUSCULAR; INTRAVENOUS ONCE AS NEEDED
Status: DISCONTINUED | OUTPATIENT
Start: 2024-05-30 | End: 2024-06-03 | Stop reason: HOSPADM

## 2024-05-30 RX ORDER — BISACODYL 10 MG/1
10 SUPPOSITORY RECTAL DAILY PRN
Status: DISCONTINUED | OUTPATIENT
Start: 2024-05-30 | End: 2024-06-03 | Stop reason: HOSPADM

## 2024-05-30 RX ORDER — ENOXAPARIN SODIUM 100 MG/ML
40 INJECTION SUBCUTANEOUS EVERY 24 HOURS
Status: DISCONTINUED | OUTPATIENT
Start: 2024-05-30 | End: 2024-06-03 | Stop reason: HOSPADM

## 2024-05-30 RX ORDER — IBUPROFEN 600 MG/1
600 TABLET ORAL EVERY 6 HOURS
Status: DISCONTINUED | OUTPATIENT
Start: 2024-05-30 | End: 2024-06-03 | Stop reason: HOSPADM

## 2024-05-30 RX ORDER — LIDOCAINE 560 MG/1
1 PATCH PERCUTANEOUS; TOPICAL; TRANSDERMAL
Status: DISCONTINUED | OUTPATIENT
Start: 2024-05-30 | End: 2024-06-03 | Stop reason: HOSPADM

## 2024-05-30 RX ORDER — TRANEXAMIC ACID 100 MG/ML
1000 INJECTION, SOLUTION INTRAVENOUS ONCE AS NEEDED
Status: DISCONTINUED | OUTPATIENT
Start: 2024-05-30 | End: 2024-06-03 | Stop reason: HOSPADM

## 2024-05-30 RX ORDER — DIPHENHYDRAMINE HYDROCHLORIDE 50 MG/ML
25 INJECTION INTRAMUSCULAR; INTRAVENOUS EVERY 6 HOURS PRN
Status: DISCONTINUED | OUTPATIENT
Start: 2024-05-30 | End: 2024-06-03 | Stop reason: HOSPADM

## 2024-05-30 RX ORDER — OXYTOCIN/0.9 % SODIUM CHLORIDE 30/500 ML
60 PLASTIC BAG, INJECTION (ML) INTRAVENOUS ONCE AS NEEDED
Status: COMPLETED | OUTPATIENT
Start: 2024-05-30 | End: 2024-05-30

## 2024-05-30 RX ORDER — ONDANSETRON 4 MG/1
4 TABLET, FILM COATED ORAL EVERY 6 HOURS PRN
Status: DISCONTINUED | OUTPATIENT
Start: 2024-05-30 | End: 2024-06-03 | Stop reason: HOSPADM

## 2024-05-30 RX ORDER — MISOPROSTOL 200 UG/1
800 TABLET ORAL ONCE AS NEEDED
Status: DISCONTINUED | OUTPATIENT
Start: 2024-05-30 | End: 2024-06-03 | Stop reason: HOSPADM

## 2024-05-30 RX ORDER — METHYLERGONOVINE MALEATE 0.2 MG/ML
0.2 INJECTION INTRAVENOUS ONCE AS NEEDED
Status: DISCONTINUED | OUTPATIENT
Start: 2024-05-30 | End: 2024-06-03 | Stop reason: HOSPADM

## 2024-05-30 RX ORDER — SIMETHICONE 80 MG
80 TABLET,CHEWABLE ORAL 4 TIMES DAILY PRN
Status: DISCONTINUED | OUTPATIENT
Start: 2024-05-30 | End: 2024-06-03 | Stop reason: HOSPADM

## 2024-05-30 RX ORDER — DIPHENHYDRAMINE HCL 25 MG
25 CAPSULE ORAL EVERY 6 HOURS PRN
Status: DISCONTINUED | OUTPATIENT
Start: 2024-05-30 | End: 2024-06-03 | Stop reason: HOSPADM

## 2024-05-30 RX ORDER — ADHESIVE BANDAGE
30 BANDAGE TOPICAL
Status: DISCONTINUED | OUTPATIENT
Start: 2024-05-30 | End: 2024-06-03 | Stop reason: HOSPADM

## 2024-05-30 RX ORDER — CARBOPROST TROMETHAMINE 250 UG/ML
250 INJECTION, SOLUTION INTRAMUSCULAR ONCE AS NEEDED
Status: DISCONTINUED | OUTPATIENT
Start: 2024-05-30 | End: 2024-06-03 | Stop reason: HOSPADM

## 2024-05-30 RX ORDER — ACETAMINOPHEN 325 MG/1
975 TABLET ORAL EVERY 6 HOURS
Status: DISCONTINUED | OUTPATIENT
Start: 2024-05-30 | End: 2024-06-03 | Stop reason: HOSPADM

## 2024-05-30 RX ORDER — ONDANSETRON HYDROCHLORIDE 2 MG/ML
4 INJECTION, SOLUTION INTRAVENOUS EVERY 6 HOURS PRN
Status: DISCONTINUED | OUTPATIENT
Start: 2024-05-30 | End: 2024-06-03 | Stop reason: HOSPADM

## 2024-05-30 RX ORDER — POLYETHYLENE GLYCOL 3350 17 G/17G
17 POWDER, FOR SOLUTION ORAL 2 TIMES DAILY PRN
Status: DISCONTINUED | OUTPATIENT
Start: 2024-05-30 | End: 2024-06-03

## 2024-05-30 RX ORDER — HYDRALAZINE HYDROCHLORIDE 20 MG/ML
5 INJECTION INTRAMUSCULAR; INTRAVENOUS ONCE AS NEEDED
Status: DISCONTINUED | OUTPATIENT
Start: 2024-05-30 | End: 2024-06-03 | Stop reason: HOSPADM

## 2024-05-30 RX ORDER — NIFEDIPINE 10 MG/1
10 CAPSULE ORAL ONCE AS NEEDED
Status: DISCONTINUED | OUTPATIENT
Start: 2024-05-30 | End: 2024-06-03 | Stop reason: HOSPADM

## 2024-05-30 RX ORDER — LABETALOL HYDROCHLORIDE 5 MG/ML
20 INJECTION, SOLUTION INTRAVENOUS ONCE AS NEEDED
Status: DISCONTINUED | OUTPATIENT
Start: 2024-05-30 | End: 2024-06-03 | Stop reason: HOSPADM

## 2024-05-30 RX ADMIN — ACETAMINOPHEN 975 MG: 325 TABLET ORAL at 08:08

## 2024-05-30 RX ADMIN — ACETAMINOPHEN 975 MG: 325 TABLET ORAL at 01:39

## 2024-05-30 RX ADMIN — ONDANSETRON 4 MG: 2 INJECTION INTRAMUSCULAR; INTRAVENOUS at 02:52

## 2024-05-30 RX ADMIN — Medication 1 EACH: at 08:37

## 2024-05-30 RX ADMIN — IBUPROFEN 600 MG: 600 TABLET ORAL at 20:36

## 2024-05-30 RX ADMIN — IBUPROFEN 600 MG: 600 TABLET ORAL at 08:08

## 2024-05-30 RX ADMIN — LIDOCAINE HYDROCHLORIDE 300 MG: 10 INJECTION, SOLUTION INFILTRATION; PERINEURAL at 00:20

## 2024-05-30 RX ADMIN — Medication 60 MILLI-UNITS/MIN: at 00:31

## 2024-05-30 RX ADMIN — IBUPROFEN 600 MG: 600 TABLET ORAL at 01:40

## 2024-05-30 RX ADMIN — ACETAMINOPHEN 975 MG: 325 TABLET ORAL at 20:36

## 2024-05-30 RX ADMIN — BENZOCAINE AND LEVOMENTHOL 1 APPLICATION: 200; 5 SPRAY TOPICAL at 08:38

## 2024-05-30 RX ADMIN — IBUPROFEN 600 MG: 600 TABLET ORAL at 14:01

## 2024-05-30 RX ADMIN — ACETAMINOPHEN 975 MG: 325 TABLET ORAL at 14:01

## 2024-05-30 RX ADMIN — ENOXAPARIN SODIUM 40 MG: 100 INJECTION SUBCUTANEOUS at 14:01

## 2024-05-30 ASSESSMENT — PAIN DESCRIPTION - LOCATION
LOCATION: ABDOMEN
LOCATION: ABDOMEN

## 2024-05-30 ASSESSMENT — PAIN SCALES - GENERAL
PAINLEVEL_OUTOF10: 6
PAINLEVEL_OUTOF10: 0 - NO PAIN
PAINLEVEL_OUTOF10: 5 - MODERATE PAIN
PAINLEVEL_OUTOF10: 2
PAINLEVEL_OUTOF10: 0 - NO PAIN
PAINLEVEL_OUTOF10: 5 - MODERATE PAIN

## 2024-05-30 NOTE — L&D DELIVERY NOTE
OB Delivery Note  2024  Lavonne Booth  33 y.o.   Vaginal, Spontaneous       with spontaneous delivery of the placenta. 2nd degree laceration s/p repair. Hemostatic periurethral laceration left unrepaired. Intrapartum course complicated by IAI s/p treatment with amp/gent.    Gestational Age: 37w5d  /Para:   Quantitative Blood Loss: Admission to Discharge: 400 mL (2024  5:29 PM - 2024 12:51 AM)    Constantino Booth [[id number]]      Labor Events    Rupture date/time: 2024 0013  Rupture type: Artificial  Fluid color: Clear  Fluid odor: None  Labor type: Induced Onset of Labor  Labor allowed to proceed with plans for an attempted vaginal birth?: Yes  Induction: Oxytocin  Induction indications: Fetal Abnormality  Complications: None       Labor Event Times    Labor onset date/time: 2024 1740  Start pushing date/time: 20248       Placenta    Placenta delivery date/time: 2024 0011  Placenta removal: Spontaneous  Placenta appearance: Intact       Cord    Vessels: 3 vessels  Complications: None  Delayed cord clamping?: Yes  Cord clamped date/time: 2024 0000  Cord blood disposition: Lab  Gases sent?: No  Stem cell collection (by provider): No       Lacerations    Episiotomy: None  Perineal laceration: 2nd  Perineal laceration repaired?: Yes  Periurethral laceration?: Yes  Repair suture: 3-0 Synthetic Suture       Anesthesia    Method: Epidural       Operative Delivery    Forceps attempted?: No  Vacuum extractor attempted?: No       Shoulder Dystocia    Shoulder dystocia present?: No       [place] Delivery    Birth date/time: 2024 00:00:00  Delivery type: Vaginal, Spontaneous  Complications: None       Resuscitation    Method: Tactile stimulation       Apgars    Living status: Living  Apgar Component Scores:  1 min.:  5 min.:  10 min.:  15 min.:  20 min.:    Skin color:  0  1       Heart rate:  2  2       Reflex irritability:  2  2       Muscle tone:  2  2        Respiratory effort:  2  2       Total:  8  9       Apgars assigned by: MARY VILLALBA       Delivery Providers    Delivering clinician: Rich Bolton MD   Provider Role    Ramona Fofana, RN Delivery Nurse    Alicia Villalba RN Nursery Nurse    Andrew Robin MD Resident                 Andrew Robin MD

## 2024-05-30 NOTE — SIGNIFICANT EVENT
Successfully manually rotated to NESSA with continued pushing efforts. Anterior lip now fully reduced. CEFM Cat I. Will continue pushing in anticipation of .    Discussed with Dr. Reji Robin MD  Labor and Delivery

## 2024-05-30 NOTE — LACTATION NOTE
"Lactation Consultant Note  Lactation Consultation  Reason for Consult: Initial assessment  Consultant Name: Dayna العلي RN, IBCLC    Maternal Information  Has mother  before?: No  Infant to breast within first 2 hours of birth?: No (attempted - not interested)  Exclusive Pump and Bottle Feed: No    Maternal Assessment  Breast Assessment: Medium, Symmetrical, Compressible  Nipple Assessment: Intact, Erect with stimulation, Other (Comment), Short (small diameter)  Areola Assessment: Other (Comment) (edematous- showed reverse pressure softening)    Infant Assessment  Infant Behavior: Deep sleep  Infant Assessment: Other (Comment), Receding chin (would not open mouth for oral assessment)    Feeding Assessment  Nutrition Source: Breastmilk  Feeding Method: Nursing at the breast  Feeding Position: C - hold, Football/seated, Skin to skin, One side per feeding, Nipple to nose, Mother needs assistance with latch/positioning  Suck/Feeding: Unsustained  Latch Assessment: Too sleepy, No latch achieved, Unable to arouse for feeding    LATCH TOOL  Latch: Too sleepy or reluctant, no latch achieved  Audible Swallowing: None  Type of Nipple: Everted (After stimulation)  Comfort (Breast/Nipple): Soft/non-tender  Hold (Positioning): Full assist, staff holds infant at breast  LATCH Score: 4    Breast Pump       Other OB Lactation Tools       Patient Follow-up  Inpatient Lactation Follow-up Needed : Yes  Outpatient Lactation Follow-up: Recommended    Other OB Lactation Documentation  Maternal Risk Factors: Age over 30, primiparity, Complicated delivery, Extreme tiredness, fatigue or stress  Infant Risk Factors: Early term birth 37-39 weeks, Other (comment) (bruised head)    Recommendations/Summary  Baby was around 13 hours of life at time of visit.   Mom stated the baby hasn't latch in life as of yet.   Mom stated she pushed baby our in around 30 minutes but, the baby was \"facing the wrong way and the baby needed to " "be turned\". The baby's head is significantly bruised.   Baby would not open mouth to suck on finger for suck assessment.   Placed baby in skin to skin and reviewed education topics in regard to breast feeding.     Mom is a TWIn and she stated her twin sister didn't have enough breast milk and needed to supplement and eventually got mastitis and stopped breast feeding.   Reviewed reasons as to why someone gets Mastitis (from lack of milk removal. She staed her sister wasn't latching the baby to the breast and was pumping. Reviewed why the breast pump flange size is important. She measures to be 15 MM/ 16 MM.   Reviewed with her that she will need (16 MM) 17 MM or 18 MM (19 MM) breast flanges for proper milk removal.   Reviewed how not removing the milk properly can decrease the milk supply.   This may be a component of her sisters poor milk supply or may be something else. But, I try to provide as much reassurance that we do not know if this will be her path as well.      Reviewed feeding cues, the normal feeding patterns in the first 24 hours of life, the role of colostrum, output on different days of life, milk production/ supply in the first few days of life, and the benefits of skin to skin.      While the baby was in skin to skin, the baby made slight movement toward the right breast, therefore I attempted to assist latching in football hold.   Reviewed positioning of baby (in football hold),  use of pillow support, hand placement on baby and on breast, expression of colostrum to the nipple prior to latching (minimally expressible),  and latching technique.  No latch achieved - after multiple attempts, I wrapped baby back up and placed in crib.     Encouraged mom to breastfeed on demand with a goal of 8-12 feeds in a 24 hour period.   If baby is not showing feeding cues and it has been 3 hours since the last time the baby was fed or the last time the baby attempted to feed, encouraged her to place baby in skin " to skin.      Encouraged her to call for assistance with feeds.     Mom has a breast pump for at home.     Encouraged her to utilize the outpatient lactation resources, if needed.   Contact information given.   [phone] [place] or [phone] Jose

## 2024-05-30 NOTE — CARE PLAN
Radha Mandujano to bedside. On SVE, patient with anterior lip/100/0. OP on exam. Patient counseled on manual rotation of baby. Discussed risks and benefits, verbal consent obtained. Dr. Robin attempted to rotate fetal head with contraction and pushing x 3, small movement to possible OT position. With 6 pushes, anterior lip reduced. Baby tolerated pushing and attempted rotation without difficulty. Dr. Robin to remain bedside and continue pushing in anticipation of .    Jose Elias Mnedoza MD

## 2024-05-31 PROCEDURE — 2500000005 HC RX 250 GENERAL PHARMACY W/O HCPCS: Performed by: STUDENT IN AN ORGANIZED HEALTH CARE EDUCATION/TRAINING PROGRAM

## 2024-05-31 PROCEDURE — 2500000004 HC RX 250 GENERAL PHARMACY W/ HCPCS (ALT 636 FOR OP/ED): Performed by: STUDENT IN AN ORGANIZED HEALTH CARE EDUCATION/TRAINING PROGRAM

## 2024-05-31 PROCEDURE — 1100000001 HC PRIVATE ROOM DAILY

## 2024-05-31 PROCEDURE — 2500000001 HC RX 250 WO HCPCS SELF ADMINISTERED DRUGS (ALT 637 FOR MEDICARE OP): Performed by: STUDENT IN AN ORGANIZED HEALTH CARE EDUCATION/TRAINING PROGRAM

## 2024-05-31 RX ORDER — IBUPROFEN 600 MG/1
600 TABLET ORAL EVERY 6 HOURS
Qty: 60 TABLET | Refills: 0 | Status: SHIPPED | OUTPATIENT
Start: 2024-05-31

## 2024-05-31 RX ORDER — ACETAMINOPHEN 325 MG/1
975 TABLET ORAL EVERY 6 HOURS
Qty: 120 TABLET | Refills: 0 | Status: SHIPPED | OUTPATIENT
Start: 2024-05-31

## 2024-05-31 RX ADMIN — ACETAMINOPHEN 975 MG: 325 TABLET ORAL at 21:07

## 2024-05-31 RX ADMIN — ACETAMINOPHEN 975 MG: 325 TABLET ORAL at 08:54

## 2024-05-31 RX ADMIN — IBUPROFEN 600 MG: 600 TABLET ORAL at 02:40

## 2024-05-31 RX ADMIN — IBUPROFEN 600 MG: 600 TABLET ORAL at 14:45

## 2024-05-31 RX ADMIN — POLYETHYLENE GLYCOL 3350 17 G: 17 POWDER, FOR SOLUTION ORAL at 13:58

## 2024-05-31 RX ADMIN — ACETAMINOPHEN 975 MG: 325 TABLET ORAL at 14:45

## 2024-05-31 RX ADMIN — ENOXAPARIN SODIUM 40 MG: 100 INJECTION SUBCUTANEOUS at 13:59

## 2024-05-31 RX ADMIN — IBUPROFEN 600 MG: 600 TABLET ORAL at 21:06

## 2024-05-31 RX ADMIN — Medication 1 EACH: at 13:52

## 2024-05-31 RX ADMIN — IBUPROFEN 600 MG: 600 TABLET ORAL at 08:53

## 2024-05-31 RX ADMIN — ACETAMINOPHEN 975 MG: 325 TABLET ORAL at 02:40

## 2024-05-31 ASSESSMENT — PAIN DESCRIPTION - DESCRIPTORS
DESCRIPTORS: CRAMPING
DESCRIPTORS: DISCOMFORT;SORE;CRAMPING
DESCRIPTORS: DISCOMFORT;CRAMPING;SORE

## 2024-05-31 ASSESSMENT — PAIN SCALES - GENERAL
PAINLEVEL_OUTOF10: 5 - MODERATE PAIN
PAINLEVEL_OUTOF10: 6
PAINLEVEL_OUTOF10: 7
PAIN_LEVEL: 2
PAINLEVEL_OUTOF10: 5 - MODERATE PAIN

## 2024-05-31 ASSESSMENT — PAIN DESCRIPTION - LOCATION: LOCATION: ABDOMEN

## 2024-05-31 ASSESSMENT — PAIN - FUNCTIONAL ASSESSMENT: PAIN_FUNCTIONAL_ASSESSMENT: 0-10

## 2024-05-31 NOTE — ANESTHESIA POSTPROCEDURE EVALUATION
Patient: Lavonne Booth    Procedure Summary       Date: 24 Room / Location:     Anesthesia Start: 7 Anesthesia Stop: 24 0000    Procedure: Labor Analgesia Diagnosis:     Scheduled Providers:  Responsible Provider: Monica Carrion MD    Anesthesia Type: epidural ASA Status: 2            Anesthesia Type: epidural    Lavonne Booth is a 33 y.o., , who had a Vaginal, Spontaneous  delivery on 2024  at 37w5d and is now POD1.    She had Neuraxial Anesthesia without immediate complications noted.       Pain well controlled    Vitals:    24 0347   BP: 109/74   Pulse: 60   Resp: 16   Temp: 36.8 °C (98.2 °F)   SpO2: 96%       Neuraxial site assessed. No visible redness or swelling or drainage. Patient able to ambulate and move all extremities without difficulty. Able to void. No complaints of nausea/vomiting. Tolerating PO intake well. No s/sx of PDPH.     Anesthesia will sign off     COSTA Mo-CRNA       Anesthesia Post Evaluation    Patient location during evaluation: floor  Patient participation: complete - patient participated  Level of consciousness: awake and alert  Pain score: 2  Pain management: adequate  Airway patency: patent  Cardiovascular status: acceptable  Respiratory status: acceptable  Hydration status: acceptable  Postoperative Nausea and Vomiting: none        No notable events documented.

## 2024-05-31 NOTE — CARE PLAN
Problem: Vaginal Birth or  Section  Goal: Minimal s/sx of HDP and BP<160/110  Outcome: Progressing  Goal: No s/sx of infection through recovery  Outcome: Progressing  Goal: No s/sx of hemorrhage through recovery  Outcome: Progressing     Problem: Pain - Adult  Goal: Verbalizes/displays adequate comfort level or baseline comfort level  Outcome: Progressing     Problem: Safety - Adult  Goal: Free from fall injury  Outcome: Progressing     Problem: Discharge Planning  Goal: Discharge to home or other facility with appropriate resources  Outcome: Progressing     Problem: Postpartum  Goal: Experiences normal postpartum course  Outcome: Progressing   The patient's goals for the shift include get the baby to latch    The clinical goals for the shift include work on latch, hand expressing    Pt VS WDL throughout shift. Pain controlled well with PO meds, made progress with pt stated goal of improving breastfeeding

## 2024-05-31 NOTE — LACTATION NOTE
Lactation Consultant Note  Lactation Consultation  Reason for Consult: Follow-up assessment  Consultant Name: CURTIS Meza IBCLC    Maternal Information  Has mother  before?: No  Infant to breast within first 2 hours of birth?: No (attempt made)  Breastfeeding Delayed Due to: Other (Comment) (attempt made)  Exclusive Pump and Bottle Feed: No    Maternal Assessment  Breast Assessment: Medium, Wide space, Soft, Compressible  Nipple Assessment: Intact, Erect  Areola Assessment: Normal    Infant Assessment  Infant Behavior: Quiet alert, Readiness to feed, Feeding cues observed, Rooting response, Sucking  Infant Assessment:  (deferred)    Feeding Assessment  Nutrition Source: Breastmilk  Feeding Method: Nursing at the breast  Feeding Position: Skin to skin, Both sides, Nipple to nose, Mother needs assistance with latch/positioning, Nose lightly touching breast, Chin tucked into breast  Suck/Feeding: Sustained, Baby led rhythmically  Latch Assessment: Moderate assistance is needed, Eagerly grasped on to latch, Areolar attachment, Optimal angle of mouth opening, Comfortable with no pain, Sucking and swallowing, Bursts of sucking, swallowing, and rest, Flanged lips, Chin moves in rhythmic motion, Comfortable latch    LATCH TOOL  Latch: Grasps breast, tongue down, lips flanged, rhythmic sucking  Audible Swallowing: A few with stimulation  Type of Nipple: Everted (After stimulation)  Comfort (Breast/Nipple): Soft/non-tender  Hold (Positioning): Minimal assist, teach one side, mother does other, staff holds  LATCH Score: 8    Breast Pump       Other OB Lactation Tools       Patient Follow-up       Other OB Lactation Documentation  Maternal Risk Factors: Age over 30, primiparity  Infant Risk Factors: Early term birth 37-39 weeks    Recommendations/Summary    I was called to assist this first-time mother with breastfeeding. The parents report that the infant has been latching, however, only for brief periods of time. I  encouraged the use of skin to skin for breastfeeding and recommended a cross-cradle hold with pillow support for latching. We reviewed correct infant body alignment, proper head/breast support, positioning the infant's nose opposite the maternal nipple, and bringing the infant to the breast with a wide, open mouth. The infant first latched well to the right side, however, required much stimulation (breast massage/compression) to keep the infant actively sucking at the breast. The infant remained on the breast for about 10 minutes. She was afterwards brought up between her mother's breasts, but continued to demonstrated feeding cues. The mother was easily latched to the left breast with more rhythmic sucking and swallowing. She had nursed for 15 minutes before I left the room. We discussed the following breastfeeding topics: the importance of a deep latch for maternal comfort and optimal milk production; alternating starting breast and allowing the infant to end the feeding; early milk production; and the benefits of skin to skin. All questions were answered and the mother is encouraged to call for assistance as needed.

## 2024-05-31 NOTE — CARE PLAN
The patient's goals for the shift include get the baby to latch    The clinical goals for the shift include pt will have stable VS

## 2024-05-31 NOTE — PROGRESS NOTES
Postpartum Progress Note    Assessment/Plan   Lavonne Booth is a 33 y.o., , who was admitted for IOL for DFM at term, delivered at 37w5d gestation and is now postpartum day 1 s/p .     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 )  - normotensive throughout admission    Maternal Well-Being  - emotional support provided     Feeding  - breastfeeding/pumping encouraged; lactation consult prn    Dispo:  anticipate d/c on PPD #2 if meeting all postpartum milestones, for f/u 1 month with Primary OB provider    SHREE Machuca    Principal Problem:    Encounter for induction of labor ([facility])  Active Problems:    PONV (postoperative nausea and vomiting)    Pregnancy Problems (from 11/10/23 to present)       Problem Noted Resolved    Encounter for induction of labor ([facility]) 2024 by Andrew Robin MD No    Priority:  Medium      Decreased fetal movements in third trimester ([facility]) 2024 by Elli Villar MD No    Priority:  Medium      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) 2024 by Elli Villar MD No    Priority:  Medium      Overview Signed 2024  6:00 PM by Elli Villar MD     - intranasal HSV diagnosed in triage 3/21 however patient had been experiencing recurrent intranasal lesions for years, approximately 4 times per year or fewer  - no history of HSV affecting the genitalia  - counseled that direct contact with the lesion can result in transmission and to take care with hand hygiene with  however no risk of intrapartum transmission          Elevated blood pressure reading without diagnosis of hypertension 3/6/2024 by Rubina Wheeler MD No    Priority:  Medium      Overview Addendum 3/6/2024  4:38 PM by Rubina Wheeler MD     2x mild range Bps at work (hospital cuff) at 25wga  HELLP labs wnl 3/6, P:C too low to calculate         37 weeks gestation of pregnancy ([facility]) 2024 by Rocio Florian, APRN-CNP No    Priority:  Medium      Prenatal care in third trimester ([facility]) 11/10/2023 by Jose Elias Mendoza MD No    Priority:  Medium      Overview Addendum 2024  4:23 PM by Mehreen Lynn MD     [x] Initial BMI: 23  [x] Dating: LMP 23  [x] Prenatal Labs: ordered  [x] Genetic Screening:  risk-reducing cfDNA; NT done   [x] Baby ASA: not indicated  [x] Anatomy US: unremarkable  [x] 1hr GCT at 24-28wks:  [x] Tdap (27-36wks):  [x] Flu Shot: 11/10/23  [x] COVID vaccine: declined  [x] GBS at 36 wks: collected   [x] Feeding: breast, working on picking out a pump  [] Postpartum Birth control method: undecided  [x] 39 weeks discussion of IOL vs. Expectant management: undecided, will readdress   [x] Mode of delivery:  anticipate          Nausea and vomiting in pregnancy prior to 22 weeks gestation ([facility]) 11/10/2023 by Jose Elias Mendoza MD 4/15/2024 by Elli Villar MD          Subjective   Her pain is well controlled with current medications  She is passing flatus  She is ambulating well  She is tolerating a Adult diet Regular  She reports no breast or nursing problems  She denies emotional concerns today     Objective   Allergies:   Azithromycin         Last Vitals:  Temp Pulse Resp BP MAP Pulse Ox   36.8 °C (98.2 °F) 68 16 108/68   98 %     Vitals Min/Max Last 24 Hours:  Temp  Min: 36.6 °C (97.9 °F)  Max: 36.8 °C (98.2 °F)  Pulse  Min: 60  Max: 76  Resp  Min: 14  Max: 16  BP  Min: 103/63  Max: 110/72    Intake/Output:   No intake or output data in the 24 hours ending 24 1609    Physical Exam:  General: well  appearing, well-nourished, postpartum  Obstetric: abdomen soft/non-tender, fundus firm below umbilicus, lochia light  Skin: No rashes/lesions/erythema  Neuro: A/Ox3, conversational  GI: +flatus  Respiratory: Even and unlabored on RA  Extremities: No edema, discoloration, or pain in BLE, equal and palpable DP and PT pulses    Psych: appropriate mood and affect     Lab Data:  Lab Results   Component Value Date    WBC 15.3 (H) 05/29/2024    HGB 11.4 (L) 05/29/2024    HCT 34.1 (L) 05/29/2024     05/29/2024

## 2024-06-01 ENCOUNTER — APPOINTMENT (OUTPATIENT)
Dept: CARDIOLOGY | Facility: HOSPITAL | Age: 34
End: 2024-06-01
Payer: COMMERCIAL

## 2024-06-01 ENCOUNTER — APPOINTMENT (OUTPATIENT)
Dept: RADIOLOGY | Facility: HOSPITAL | Age: 34
End: 2024-06-01
Payer: COMMERCIAL

## 2024-06-01 LAB
ALBUMIN SERPL BCP-MCNC: 2.7 G/DL (ref 3.4–5)
ALP SERPL-CCNC: 65 U/L (ref 33–110)
ALT SERPL W P-5'-P-CCNC: 10 U/L (ref 7–45)
ANION GAP SERPL CALC-SCNC: 14 MMOL/L (ref 10–20)
AST SERPL W P-5'-P-CCNC: 17 U/L (ref 9–39)
BILIRUB SERPL-MCNC: 0.2 MG/DL (ref 0–1.2)
BNP SERPL-MCNC: 436 PG/ML (ref 0–99)
BUN SERPL-MCNC: 17 MG/DL (ref 6–23)
CALCIUM SERPL-MCNC: 8.3 MG/DL (ref 8.6–10.6)
CHLORIDE SERPL-SCNC: 109 MMOL/L (ref 98–107)
CO2 SERPL-SCNC: 23 MMOL/L (ref 21–32)
CREAT SERPL-MCNC: 1 MG/DL (ref 0.5–1.05)
EGFRCR SERPLBLD CKD-EPI 2021: 77 ML/MIN/1.73M*2
ERYTHROCYTE [DISTWIDTH] IN BLOOD BY AUTOMATED COUNT: 12 % (ref 11.5–14.5)
GLUCOSE SERPL-MCNC: 63 MG/DL (ref 74–99)
HCT VFR BLD AUTO: 29.1 % (ref 36–46)
HGB BLD-MCNC: 10.1 G/DL (ref 12–16)
MCH RBC QN AUTO: 32.8 PG (ref 26–34)
MCHC RBC AUTO-ENTMCNC: 34.7 G/DL (ref 32–36)
MCV RBC AUTO: 95 FL (ref 80–100)
NRBC BLD-RTO: 0 /100 WBCS (ref 0–0)
PLATELET # BLD AUTO: 218 X10*3/UL (ref 150–450)
POTASSIUM SERPL-SCNC: 4 MMOL/L (ref 3.5–5.3)
PROT SERPL-MCNC: 4.7 G/DL (ref 6.4–8.2)
RBC # BLD AUTO: 3.08 X10*6/UL (ref 4–5.2)
SODIUM SERPL-SCNC: 142 MMOL/L (ref 136–145)
WBC # BLD AUTO: 10.4 X10*3/UL (ref 4.4–11.3)

## 2024-06-01 PROCEDURE — 1100000001 HC PRIVATE ROOM DAILY

## 2024-06-01 PROCEDURE — 93005 ELECTROCARDIOGRAM TRACING: CPT

## 2024-06-01 PROCEDURE — 85027 COMPLETE CBC AUTOMATED: CPT | Performed by: FAMILY MEDICINE

## 2024-06-01 PROCEDURE — 2500000005 HC RX 250 GENERAL PHARMACY W/O HCPCS: Performed by: STUDENT IN AN ORGANIZED HEALTH CARE EDUCATION/TRAINING PROGRAM

## 2024-06-01 PROCEDURE — 2500000001 HC RX 250 WO HCPCS SELF ADMINISTERED DRUGS (ALT 637 FOR MEDICARE OP): Performed by: STUDENT IN AN ORGANIZED HEALTH CARE EDUCATION/TRAINING PROGRAM

## 2024-06-01 PROCEDURE — 80053 COMPREHEN METABOLIC PANEL: CPT | Performed by: FAMILY MEDICINE

## 2024-06-01 PROCEDURE — 36415 COLL VENOUS BLD VENIPUNCTURE: CPT | Performed by: FAMILY MEDICINE

## 2024-06-01 PROCEDURE — 71045 X-RAY EXAM CHEST 1 VIEW: CPT | Performed by: RADIOLOGY

## 2024-06-01 PROCEDURE — 71045 X-RAY EXAM CHEST 1 VIEW: CPT

## 2024-06-01 PROCEDURE — 83880 ASSAY OF NATRIURETIC PEPTIDE: CPT | Performed by: FAMILY MEDICINE

## 2024-06-01 PROCEDURE — 2500000004 HC RX 250 GENERAL PHARMACY W/ HCPCS (ALT 636 FOR OP/ED): Performed by: STUDENT IN AN ORGANIZED HEALTH CARE EDUCATION/TRAINING PROGRAM

## 2024-06-01 RX ORDER — FUROSEMIDE 10 MG/ML
20 INJECTION INTRAMUSCULAR; INTRAVENOUS DAILY
Status: DISCONTINUED | OUTPATIENT
Start: 2024-06-02 | End: 2024-06-02

## 2024-06-01 RX ORDER — FUROSEMIDE 10 MG/ML
20 INJECTION INTRAMUSCULAR; INTRAVENOUS ONCE
Status: COMPLETED | OUTPATIENT
Start: 2024-06-01 | End: 2024-06-01

## 2024-06-01 RX ADMIN — ACETAMINOPHEN 975 MG: 325 TABLET ORAL at 23:47

## 2024-06-01 RX ADMIN — BENZOCAINE AND LEVOMENTHOL 1 APPLICATION: 200; 5 SPRAY TOPICAL at 12:33

## 2024-06-01 RX ADMIN — Medication 1 EACH: at 23:46

## 2024-06-01 RX ADMIN — FUROSEMIDE 20 MG: 10 INJECTION, SOLUTION INTRAMUSCULAR; INTRAVENOUS at 23:46

## 2024-06-01 RX ADMIN — IBUPROFEN 600 MG: 600 TABLET ORAL at 09:40

## 2024-06-01 RX ADMIN — ONDANSETRON HYDROCHLORIDE 4 MG: 4 TABLET, FILM COATED ORAL at 12:33

## 2024-06-01 RX ADMIN — BENZOCAINE AND LEVOMENTHOL 1 APPLICATION: 200; 5 SPRAY TOPICAL at 23:46

## 2024-06-01 RX ADMIN — Medication 1 EACH: at 12:33

## 2024-06-01 RX ADMIN — IBUPROFEN 600 MG: 600 TABLET ORAL at 02:55

## 2024-06-01 RX ADMIN — POLYETHYLENE GLYCOL 3350 17 G: 17 POWDER, FOR SOLUTION ORAL at 02:59

## 2024-06-01 RX ADMIN — ACETAMINOPHEN 975 MG: 325 TABLET ORAL at 09:40

## 2024-06-01 RX ADMIN — ACETAMINOPHEN 975 MG: 325 TABLET ORAL at 02:55

## 2024-06-01 ASSESSMENT — PAIN SCALES - GENERAL
PAINLEVEL_OUTOF10: 0 - NO PAIN
PAINLEVEL_OUTOF10: 3
PAINLEVEL_OUTOF10: 6
PAINLEVEL_OUTOF10: 5 - MODERATE PAIN

## 2024-06-01 NOTE — LACTATION NOTE
Lactation Consultant Note  Lactation Consultation  Reason for Consult: Follow-up assessment  Consultant Name: Nina Carrillo RN IBCLC    Maternal Information  Has mother  before?: No  Infant to breast within first 2 hours of birth?: Yes  Exclusive Pump and Bottle Feed: No    Maternal Assessment  Breast Assessment: Medium, Symmetrical, Filling  Nipple Assessment: Intact, Flat, Other (Comment) (Mom normally has shorter nipples but they are flattening into her areolas, which have become edematous bilaterally)  Areola Assessment: Engorged    Infant Assessment  Infant Behavior: Awake, Readiness to feed, Feeding cues observed  Infant Assessment: Good cupping of tongue, Good lateral movement of tongue, Able to elevate tongue to roof of mouth    Feeding Assessment  Nutrition Source: Breastmilk, Donor human milk  Feeding Method: Nursing at the breast, Paced bottle, Syringe feeding  Feeding Position: Skin to skin, Football/seated, Cross - cradle  Suck/Feeding: Sustained, Supplemented breast  Latch Assessment: Deep latch obtained, Instructed on deep latch, Eagerly grasped on to latch, Latch achieved after repeated attempts, Maximum assistance is needed, Flanged lips, Comfortable latch, Correct tongue position    LATCH TOOL  Latch: Grasps breast, tongue down, lips flanged, rhythmic sucking  Audible Swallowing: Spontaneous and intermittent (24 hours old)  Type of Nipple: Everted (After stimulation)  Comfort (Breast/Nipple): Filling, red/small blisters/bruises, mild/moderate discomfort  Hold (Positioning): Full assist, staff holds infant at breast  LATCH Score: 7    Breast Pump  Pump: Hospital grade electric pump  Frequency: 5-7 times per day  Duration: 15-20 minutes per session  Breast Shield Size and Type: 21 mm  Units of Volume: Drops    Patient Follow-up  Inpatient Lactation Follow-up Needed : Yes  Outpatient Lactation Follow-up: Recommended    Other OB Lactation Documentation  Maternal Risk Factors: Age over 30,  primiparity  Infant Risk Factors: Early term birth 37-39 weeks    Recommendations/Summary  Overnight, baby became very fussy and frantic at the breast, per parents. Nursing staff suggested that baby receive donor milk via paced bottle feeding because she was so fussy and was having difficulty latching. Baby was fed twice with donor milk via paced bottle feeding in the early hours of the morning and family was finally able to rest after baby was bottle fed. Mom states that she noticed that her areolas had become puffier overnight and that her nipples were flattening. Mom was concerned and was not sure why this swelling was happening. I reassured mom that swelling of the areolas is typical as milk begins to come in, and that nipples will often flatten into the areolar swelling. Demonstrated to mom how to use reverse pressure softening and how to use a kneading motion on the areolas to help erect the nipple. Mom was able to return this demonstration. We attempted to latch baby first at the left breast in cross cradle hold but baby could not sustain the latch on this side. We switched baby to football hold at the right breast and baby was able to latch after a few minutes of reverse pressure softening on this side. Baby was searching for a faster flow, so I showed parents how to use a syringe at the breast to supplement baby with donor breast milk while she is latched. After a few attempts, baby was able to remain latched to the breast and sucked vigorously with many swallows noted. Baby even had several swallows during times where I was not syringe feeding her at the breast, indicating that she was transferring milk well from the breast. Baby remained latched for about 10 minutes before self unlatching and appearing satiated. She took 10 mls in total of donor milk via syringe at the breast.     Feeding plan that I discussed with parents is to attempt to latch baby in skin to skin every 2-3 hours. Mom was instructed to  use techniques that we reviewed to help aristides her nipple prior to feeding. Parents will use syringes of donor breast milk at the breast with each feed. If baby is still fussy after the breastfeed, parents can supplement with additional donor milk via syringe or bottle so that baby receives a total of up to 20-30 mls of supplement with breastfeeds. Discussed to give less supplement if baby appears satiated with less, as occurred with this last feed. Baby should be supplemented at every feed while she is still working on latching and as mom's milk is continuing to come in. Mom will pump both breasts after ineffective breastfeeds for 15-20 mins at a time. Reviewed with mom how to order the size 17 mm flanges that she will need to use for her pump at home. Reviewed the outpatient lactation information. Dad will be obtaining donor milk from [facility] today to use for at home supplementation. Family will follow up with [facility] lactation on Monday.

## 2024-06-01 NOTE — SIGNIFICANT EVENT
Called by RN to report intermittent chest heaviness    To bedside to assess    Reports intermittent heaviness over entire chest, worse with laying flat. States has been occurring for a few days but is the worst today. Denies SOB, cough. +orthopnea.    Lungs CTA, RRR  /76  HR 60  SpO2 95%  T 36.7    EKG ordered    BNP, CMP, CBC ordered  Will consider CXR if sx continue       Jeanette Whitfield, APRN-CNP

## 2024-06-01 NOTE — CARE PLAN
The patient's goals for the shift include rest, breastfeed    The clinical goals for the shift include rest    Over the shift, the patient has made progress towards the following goals   Problem: Postpartum  Goal: Minimal s/sx of HDP and BP<160/110  Outcome: Progressing  Goal: Appropriate maternal -  bonding  Outcome: Progressing  Goal: Establish and maintain infant feeding pattern for adequate nutrition  Outcome: Progressing

## 2024-06-01 NOTE — DISCHARGE SUMMARY
Discharge Summary    Admission Date: 2024  Discharge Date: 24  Discharge Diagnosis: Encounter for induction of labor ([facility])       Patient Active Problem List   Diagnosis    Allergic rhinitis    Amblyopia    Prenatal care in third trimester ([facility])    Mild intermittent asthma without complication ([facility])    Costochondritis    37 weeks gestation of pregnancy ([facility])    Elevated blood pressure reading without diagnosis of hypertension    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)    Encounter for induction of labor ([facility])    Decreased fetal movements in third trimester ([facility])    PONV (postoperative nausea and vomiting)       Hospital Course  Lavonne Booth is a 33 y.o.,       Initially presented for: IOL for dec FM at term    Admission Date: 2024    Delivery Date: 2024  12:00 AM     Delivery type: Vaginal, Spontaneous      GA at delivery: 37w5d    Outcome: Living     Anesthesia during delivery: Epidural     Intrapartum complications: None     Feeding method: Breastfeeding Status: Yes    Contraception: Declines bridge method    Rhogam: The patient's blood type is O POS. The baby's blood type is O POS . Rhogam is not indicated.     Now postpartum day: 2.    Hospital course n/f:    IAI  - s/p Amp/Gent intrapartum  - VSS, fundus appropriately tender    Elevated BP w/o dx of HDP  - normotensive since delivery  - asx    PP course otherwise uneventful.  Meeting all postpartum milestones- ambulating independently, passing flatus, tolerating PO intake, lochia light, voiding spontaneously, and pain well controlled with PO meds.       Dispo  OK for DC today    - The signs and symptoms of PEC were reviewed with the patient, including unrelenting headache, vision changes/blurred vision, and RUQ pain    - Follow up with primary OB in:       - 4-6 weeks for post-partum visit       Pertinent Physical Exam At Time of Discharge  General: well appearing, well nourished,  postpartum  Obstetric: fundus firm below umbilicus, lochia light  Skin: Warm, dry; no rashes/lesions/erythema  Neuro: A/Ox3, conversational, no gross motor deficit   GI: no distension, appropriately tender, soft  Respiratory: Even and unlabored on RA  Cardiovascular: Trace BLE edema; No erythema, warmth  Psych: appropriate mood and affect          Your medication list        START taking these medications        Instructions Last Dose Given Next Dose Due   acetaminophen 325 mg tablet  Commonly known as: Tylenol      Take 3 tablets (975 mg) by mouth every 6 hours.       ibuprofen 600 mg tablet      Take 1 tablet (600 mg) by mouth every 6 hours.              CONTINUE taking these medications        Instructions Last Dose Given Next Dose Due   cyclobenzaprine 10 mg tablet  Commonly known as: Flexeril      Take 1 tablet (10 mg) by mouth 3 times a day as needed for muscle spasms.       mupirocin 2 % cream  Commonly known as: Bactroban      Apply topically 3 times a day.       prenatal vitamin (iron-folic) 27 mg iron-800 mcg folic acid tablet      Take 1 tablet by mouth once daily.              STOP taking these medications      valACYclovir 500 mg tablet  Commonly known as: Valtrex                  Where to Get Your Medications        These medications were sent to [id number] - [place], [place] - [address]  [address], [facility] [address]      Phone: [phone]   acetaminophen 325 mg tablet  ibuprofen 600 mg tablet             Outpatient Follow-Up  Future Appointments   Date Time Provider Department Center   6/4/2024  3:30 PM MONSERRAT Dash, APRN-CNP [id number]   6/11/2024  3:45 PM MONSERRAT Irving [id number]       DAVID Quarles

## 2024-06-02 LAB
ALBUMIN SERPL BCP-MCNC: 2.9 G/DL (ref 3.4–5)
ALP SERPL-CCNC: 66 U/L (ref 33–110)
ALT SERPL W P-5'-P-CCNC: 13 U/L (ref 7–45)
ANION GAP SERPL CALC-SCNC: 13 MMOL/L (ref 10–20)
AST SERPL W P-5'-P-CCNC: 19 U/L (ref 9–39)
BILIRUB SERPL-MCNC: 0.2 MG/DL (ref 0–1.2)
BUN SERPL-MCNC: 19 MG/DL (ref 6–23)
CALCIUM SERPL-MCNC: 9.2 MG/DL (ref 8.6–10.6)
CHLORIDE SERPL-SCNC: 105 MMOL/L (ref 98–107)
CHLORIDE UR-SCNC: 132 MMOL/L
CHLORIDE/CREATININE (MMOL/G) IN URINE: 369 MMOL/G CREAT (ref 38–318)
CO2 SERPL-SCNC: 27 MMOL/L (ref 21–32)
CREAT SERPL-MCNC: 0.96 MG/DL (ref 0.5–1.05)
CREAT UR-MCNC: 35.8 MG/DL (ref 20–320)
EGFRCR SERPLBLD CKD-EPI 2021: 81 ML/MIN/1.73M*2
ERYTHROCYTE [DISTWIDTH] IN BLOOD BY AUTOMATED COUNT: 11.9 % (ref 11.5–14.5)
GLUCOSE SERPL-MCNC: 94 MG/DL (ref 74–99)
HCT VFR BLD AUTO: 28.3 % (ref 36–46)
HGB BLD-MCNC: 9.8 G/DL (ref 12–16)
MCH RBC QN AUTO: 31.9 PG (ref 26–34)
MCHC RBC AUTO-ENTMCNC: 34.6 G/DL (ref 32–36)
MCV RBC AUTO: 92 FL (ref 80–100)
NRBC BLD-RTO: 0 /100 WBCS (ref 0–0)
PLATELET # BLD AUTO: 225 X10*3/UL (ref 150–450)
POTASSIUM SERPL-SCNC: 4.2 MMOL/L (ref 3.5–5.3)
POTASSIUM UR-SCNC: 22 MMOL/L
POTASSIUM/CREAT UR-RTO: 61 MMOL/G CREAT
PROT SERPL-MCNC: 5 G/DL (ref 6.4–8.2)
RBC # BLD AUTO: 3.07 X10*6/UL (ref 4–5.2)
SODIUM SERPL-SCNC: 141 MMOL/L (ref 136–145)
SODIUM UR-SCNC: 112 MMOL/L
SODIUM/CREAT UR-RTO: 313 MMOL/G CREAT
WBC # BLD AUTO: 9.8 X10*3/UL (ref 4.4–11.3)

## 2024-06-02 PROCEDURE — 85027 COMPLETE CBC AUTOMATED: CPT | Performed by: STUDENT IN AN ORGANIZED HEALTH CARE EDUCATION/TRAINING PROGRAM

## 2024-06-02 PROCEDURE — 2500000004 HC RX 250 GENERAL PHARMACY W/ HCPCS (ALT 636 FOR OP/ED): Performed by: STUDENT IN AN ORGANIZED HEALTH CARE EDUCATION/TRAINING PROGRAM

## 2024-06-02 PROCEDURE — 84075 ASSAY ALKALINE PHOSPHATASE: CPT | Performed by: STUDENT IN AN ORGANIZED HEALTH CARE EDUCATION/TRAINING PROGRAM

## 2024-06-02 PROCEDURE — 36415 COLL VENOUS BLD VENIPUNCTURE: CPT | Performed by: STUDENT IN AN ORGANIZED HEALTH CARE EDUCATION/TRAINING PROGRAM

## 2024-06-02 PROCEDURE — 2500000005 HC RX 250 GENERAL PHARMACY W/O HCPCS: Performed by: STUDENT IN AN ORGANIZED HEALTH CARE EDUCATION/TRAINING PROGRAM

## 2024-06-02 PROCEDURE — 1100000001 HC PRIVATE ROOM DAILY

## 2024-06-02 PROCEDURE — 82436 ASSAY OF URINE CHLORIDE: CPT | Performed by: STUDENT IN AN ORGANIZED HEALTH CARE EDUCATION/TRAINING PROGRAM

## 2024-06-02 PROCEDURE — 2500000001 HC RX 250 WO HCPCS SELF ADMINISTERED DRUGS (ALT 637 FOR MEDICARE OP): Performed by: STUDENT IN AN ORGANIZED HEALTH CARE EDUCATION/TRAINING PROGRAM

## 2024-06-02 RX ORDER — FUROSEMIDE 10 MG/ML
20 INJECTION INTRAMUSCULAR; INTRAVENOUS DAILY
Status: DISCONTINUED | OUTPATIENT
Start: 2024-06-02 | End: 2024-06-03 | Stop reason: HOSPADM

## 2024-06-02 RX ADMIN — ACETAMINOPHEN 975 MG: 325 TABLET ORAL at 11:37

## 2024-06-02 RX ADMIN — IBUPROFEN 600 MG: 600 TABLET ORAL at 14:17

## 2024-06-02 RX ADMIN — FUROSEMIDE 20 MG: 10 INJECTION, SOLUTION INTRAMUSCULAR; INTRAVENOUS at 11:37

## 2024-06-02 RX ADMIN — ACETAMINOPHEN 975 MG: 325 TABLET ORAL at 21:20

## 2024-06-02 RX ADMIN — IBUPROFEN 600 MG: 600 TABLET ORAL at 21:24

## 2024-06-02 RX ADMIN — ENOXAPARIN SODIUM 40 MG: 100 INJECTION SUBCUTANEOUS at 14:17

## 2024-06-02 RX ADMIN — Medication 1 EACH: at 15:57

## 2024-06-02 ASSESSMENT — PAIN SCALES - GENERAL
PAINLEVEL_OUTOF10: 7
PAINLEVEL_OUTOF10: 0 - NO PAIN
PAINLEVEL_OUTOF10: 7

## 2024-06-02 NOTE — PROGRESS NOTES
Postpartum Progress Note      Assessment/Plan     Lavonne Booth is a 33 y.o., , who initially presented for IOL for decreased fetal movement at term. She had a Vaginal, Spontaneous  delivery on 2024  at 37w5d and is now PPD3.    Orthopnea, SOB  - reported worsening difficulty with deep breath, especially with laying flat  - EKG NSR  - CXR revealed mild interstitial pulmonary edema -> Lasix 20mg IVP given  - BNP elevated at 436 -> ECHO pending  - afebrile, no leukocytosis, SpO2 >96%    IAI  - s/p Amp/gent intrapartum  - VSS, afebrile, fundus appropriately tender  - Crt 0.82 -> 1.00 -> 0.96 (0.69 baseline)     Acute blood loss anemia  - HMG 11.4 ->  -> PPD2 10.1 -> PPD3 9.8  - orthopnea, otherwise asymptomatic  - for PO Fe at discharge     Elevated BP w/o dx of HDP  - normotensive since delivery  - asymptomatic      - continue routine care  - pain well controlled on ERAS protocol  - DVT Score: 3 SCDs  - Pt's blood type is O POS. The baby's blood type is O POS . Rhogam is not indicated.    Contraception  Declines bridge method    Maternal Well-Being  - emotional support provided  - bonding with infant  - [place] feeding: breastfeeding/pumping encouraged; lactation consult prn     Dispo  - anticipate d/c on PPD#4 if meeting all post-op and postpartum milestones    - The signs and symptoms of PEC were reviewed with the patient, including unrelenting headache, vision changes/blurred vision, and RUQ pain    - On discharge, follow up with primary OB in:       - 4-6 weeks for post-partum visit       Principal Problem:    Encounter for induction of labor ([facility])  Active Problems:    PONV (postoperative nausea and vomiting)        Subjective   Her pain is well controlled with current medications  She is passing flatus  She is ambulating independently  She is tolerating a Adult diet Regular  She is voiding spontaneously  She reports no breast or nursing problems  She has emotional concerns today  regarding infant being under bili lights and not being ready for discharge      Objective   Last Vitals:  Temp Pulse Resp BP MAP Pulse Ox   36.5 °C (97.7 °F) 63 16 111/67   96 %       Physical Exam:  General: well appearing, well nourished, postpartum  Obstetric: fundus firm below umbilicus, lochia light  Skin: Warm, dry; no rashes/lesions/erythema  Neuro: A/Ox3, no gross motor deficit   GI: no distension, appropriately tender, soft  Respiratory: Even and unlabored on RA, reports some improvement in orthopnea since Lasix  Cardiovascular: Trace BLE edema; No erythema, warmth  Psych: appropriate mood and affect, tearful intermittently when speaking about infant      Lab Data:  Lab Results   Component Value Date    WBC 9.8 06/02/2024    HGB 9.8 (L) 06/02/2024    HCT 28.3 (L) 06/02/2024     06/02/2024

## 2024-06-02 NOTE — PROGRESS NOTES
Postpartum Progress Note      Assessment/Plan       Lavonne Booth is a 33 y.o., , who initially presented for IOL for dec FM at term. She had a Vaginal, Spontaneous  delivery on 2024  at 37w5d and is now PPD2.      IAI  - s/p Amp/gent intrapartum  - VSS, afebrile, fundus appropriately tender      - continue routine care  - pain well controlled on ERAS protocol  - DVT Score: 3 SCDs  - Pt's blood type is O POS. The baby's blood type is O POS . Rhogam is not indicated.    Elevated BP w/o dx of HDP  - normotensive since delivery  - asymptomatic    Contraception  Declines bridge method    Maternal Well-Being  - emotional support provided  - bonding with infant  -  feeding: breastfeeding/pumping encouraged; lactation consult prn     Dispo  - anticipate d/c on PPD#2-3 if meeting all post-op and postpartum milestones    - The signs and symptoms of PEC were reviewed with the patient, including unrelenting headache, vision changes/blurred vision, and RUQ pain     - On discharge, follow up with primary OB in:       - 4-6 weeks for post-partum visit       Principal Problem:    Encounter for induction of labor ([facility])  Active Problems:    PONV (postoperative nausea and vomiting)        Subjective   Her pain is well controlled with current medications  She is passing flatus  She is ambulating independently  She is tolerating a Adult diet Regular  She is voiding spontaneously  She reports no breast or nursing problems  She denies emotional concerns today         Objective   Last Vitals:  Temp Pulse Resp BP MAP Pulse Ox   36 °C (96.8 °F) 59 17 121/75   96 %       Physical Exam:  General: well appearing, well nourished, postpartum  Obstetric: fundus firm below umbilicus, lochia light  Skin: Warm, dry; no rashes/lesions/erythema  Neuro: A/Ox3, no gross motor deficit   GI: no distension, appropriately tender, soft  Respiratory: Even and unlabored on RA  Cardiovascular: Trace BLE edema; No erythema,  warmth  Psych: appropriate mood and affect      Lab Data:  Lab Results   Component Value Date    WBC 9.8 06/02/2024    HGB 9.8 (L) 06/02/2024    HCT 28.3 (L) 06/02/2024     06/02/2024

## 2024-06-02 NOTE — SIGNIFICANT EVENT
"House staff to patient bedside for orthopnea    S)  Patient describes having chest tightness and SOB lying down, which gets better on sitting or standing. She is able to mobilize, was able to take shower or take care of her baby.    O)  Blood pressure 130/78, pulse 62, temperature 37.3 °C (99.1 °F), temperature source Temporal, resp. rate 16, height 1.702 m (5' 7\"), weight 81.3 kg (179 lb 3.7 oz), last menstrual period 2023, SpO2 97%, currently breastfeeding.    General: no acute distress  HEENT: normocephalic, atraumatic  Heart: warm and well perfused  Lungs: decreased respiratory sounds in the basal lungs bilaterally  Abd: soft, uterus firm, below umbilicus  Extremities: moving all extremities, ++ edema on bilateral lower legs, no pain on dorsiflexion of feet or palpation of calves    A&P)    32 yo G1 now P1 on PPD 2 from an uncomplicated  presenting for orthopnea    - CXR wnl  - CBC , CMP wnl, except Cr 1.0 from 0.69 baseline (used Gent for IAI)  -   - Echo ordered  - Lasix 20 mg IV given    D/w Dr. Altamirano, L&D chief  Kayleen Yang MD PGY1    "

## 2024-06-03 ENCOUNTER — APPOINTMENT (OUTPATIENT)
Dept: CARDIOLOGY | Facility: HOSPITAL | Age: 34
End: 2024-06-03
Payer: COMMERCIAL

## 2024-06-03 VITALS
WEIGHT: 179.23 LBS | OXYGEN SATURATION: 95 % | SYSTOLIC BLOOD PRESSURE: 127 MMHG | HEART RATE: 62 BPM | HEIGHT: 67 IN | BODY MASS INDEX: 28.13 KG/M2 | DIASTOLIC BLOOD PRESSURE: 75 MMHG | RESPIRATION RATE: 16 BRPM | TEMPERATURE: 98.1 F

## 2024-06-03 LAB
AORTIC VALVE MEAN GRADIENT: 6 MMHG
AORTIC VALVE PEAK VELOCITY: 1.78 M/S
ATRIAL RATE: 60 BPM
AV PEAK GRADIENT: 12.7 MMHG
AVA (PEAK VEL): 2.28 CM2
AVA (VTI): 2.23 CM2
EJECTION FRACTION APICAL 4 CHAMBER: 52.2
LABORATORY COMMENT REPORT: NORMAL
LEFT ATRIUM VOLUME AREA LENGTH INDEX BSA: 36 ML/M2
LEFT VENTRICLE INTERNAL DIMENSION DIASTOLE: 4.9 CM (ref 3.5–6)
LEFT VENTRICULAR OUTFLOW TRACT DIAMETER: 1.9 CM
LV EJECTION FRACTION BIPLANE: 59 %
MITRAL VALVE E/A RATIO: 2.02
MITRAL VALVE E/E' RATIO: 9.41
P AXIS: -10 DEGREES
P OFFSET: 193 MS
P ONSET: 152 MS
PATH REPORT.FINAL DX SPEC: NORMAL
PATH REPORT.GROSS SPEC: NORMAL
PATH REPORT.RELEVANT HX SPEC: NORMAL
PATH REPORT.TOTAL CANCER: NORMAL
PR INTERVAL: 146 MS
Q ONSET: 225 MS
QRS COUNT: 9 BEATS
QRS DURATION: 72 MS
QT INTERVAL: 386 MS
QTC CALCULATION(BAZETT): 386 MS
QTC FREDERICIA: 386 MS
R AXIS: 58 DEGREES
RIGHT VENTRICLE FREE WALL PEAK S': 13.2 CM/S
T AXIS: 39 DEGREES
T OFFSET: 418 MS
TRICUSPID ANNULAR PLANE SYSTOLIC EXCURSION: 3.1 CM
VENTRICULAR RATE: 60 BPM

## 2024-06-03 PROCEDURE — 93306 TTE W/DOPPLER COMPLETE: CPT | Performed by: INTERNAL MEDICINE

## 2024-06-03 PROCEDURE — 93306 TTE W/DOPPLER COMPLETE: CPT

## 2024-06-03 PROCEDURE — 2500000005 HC RX 250 GENERAL PHARMACY W/O HCPCS: Performed by: STUDENT IN AN ORGANIZED HEALTH CARE EDUCATION/TRAINING PROGRAM

## 2024-06-03 PROCEDURE — 2500000004 HC RX 250 GENERAL PHARMACY W/ HCPCS (ALT 636 FOR OP/ED): Performed by: STUDENT IN AN ORGANIZED HEALTH CARE EDUCATION/TRAINING PROGRAM

## 2024-06-03 PROCEDURE — 2500000001 HC RX 250 WO HCPCS SELF ADMINISTERED DRUGS (ALT 637 FOR MEDICARE OP): Performed by: STUDENT IN AN ORGANIZED HEALTH CARE EDUCATION/TRAINING PROGRAM

## 2024-06-03 RX ORDER — FERROUS SULFATE 325(65) MG
65 TABLET ORAL
Qty: 30 TABLET | Refills: 1 | Status: SHIPPED | OUTPATIENT
Start: 2024-06-03 | End: 2024-08-02

## 2024-06-03 RX ORDER — FUROSEMIDE 20 MG/1
20 TABLET ORAL DAILY
Qty: 4 TABLET | Refills: 0 | Status: SHIPPED | OUTPATIENT
Start: 2024-06-03 | End: 2024-06-07

## 2024-06-03 RX ADMIN — ACETAMINOPHEN 975 MG: 325 TABLET ORAL at 06:12

## 2024-06-03 RX ADMIN — PERFLUTREN 2 ML OF DILUTION: 6.52 INJECTION, SUSPENSION INTRAVENOUS at 08:59

## 2024-06-03 RX ADMIN — IBUPROFEN 600 MG: 600 TABLET ORAL at 06:12

## 2024-06-03 RX ADMIN — IBUPROFEN 600 MG: 600 TABLET ORAL at 12:26

## 2024-06-03 RX ADMIN — FUROSEMIDE 20 MG: 10 INJECTION, SOLUTION INTRAMUSCULAR; INTRAVENOUS at 09:40

## 2024-06-03 RX ADMIN — ACETAMINOPHEN 975 MG: 325 TABLET ORAL at 12:26

## 2024-06-03 RX ADMIN — ENOXAPARIN SODIUM 40 MG: 100 INJECTION SUBCUTANEOUS at 12:26

## 2024-06-03 RX ADMIN — Medication 1 EACH: at 13:45

## 2024-06-03 ASSESSMENT — PAIN SCALES - GENERAL
PAINLEVEL_OUTOF10: 2
PAINLEVEL_OUTOF10: 0 - NO PAIN

## 2024-06-03 NOTE — LACTATION NOTE
Lactation Consultant Note  Lactation Consultation  Reason for Consult: Follow-up assessment, Other (Comment) (SNS/ pumping)  Consultant Name: Dayna العلي RN, IBCLC    Maternal Information       Maternal Assessment  Breast Assessment: Medium, Symmetrical, Full, Plugged duct(s) (near axillae- plugged ducts noted - encouraged smaller flange sizes, warm compresses prior to latching/ pumping, breast massage, RPS and cold compresses after)  Nipple Assessment: Intact, Other (Comment), Erect (small diameter)  Areola Assessment: Other (Comment) (edematous- showed reverse pressure softening.)    Infant Assessment  Infant Behavior: Deep sleep, Other (Comment)    Feeding Assessment  Nutrition Source: Breastmilk, Donor human milk  Feeding Method: Nursing at the breast, Paced bottle, Supplemental nursing system (supplemented with bottle x 1)  Unable to assess infant feeding at this time: Other (Comment) (baby recently fed)    LATCH TOOL       Breast Pump  Pump: Double breast pumping, Individual user electric pump, Massage  Frequency: 8-10 times per day  Duration: 15-20 minutes per session  Breast Shield Size and Type: Other (comment) (using 17MM/ 19 mm)  Units of Volume: mL per session    Other OB Lactation Tools  Lactation Tools: Flanges    Patient Follow-up  Outpatient Lactation Follow-up: Recommended    Other OB Lactation Documentation  Maternal Risk Factors: Age over 30, primiparity, Other (comment) (using SNS with feeds)  Infant Risk Factors: Early term birth 37-39 weeks    Recommendations/Summary  I did not view a latch at this time.   Mom stated the baby was recently fed supplement with bottle d/t she was off the floor for a test. She has been latching the baby to the breast with SNS (donor breat milk) for feeds.   Mom is pumping now - noted periareolar edema, full breasts, plugged ducts near her axillae (no redness, warmth, no pain- per mom). She was able to get a few ML's out of her left breast but, drops out  of the right.   Showed parents reverse pressure softening (and encouraged her to do this first prior to pumping/ latching), use smaller breast flanges (she has her own pump and is using the smaller flanges), pump one breast at a time and massage toward the nipple from the axillae area, discussed use of warm compresses vs. Cold and when to use them, and Reviewed PI- sheet on engorgement.     Encouraged mom to breastfeed on demand with a goal of 8-12 feeds in a 24 hour period. (Use SNS with supplement if needed).   If baby is not showing feeding cues and it has been 3 hours since the last time the baby was fed or the last time the baby attempted to feed, encouraged her to place baby in skin to skin.    If she is supplementing the baby at the breast with the SNS; encouraged her to pump for 20 minutes. Anything pumped out can be given to the baby.   Encouraged her to use all of the tips that I discussed above to get the milk flowing when pumping.   But, if the baby is feeding well at the breast without he SNS, then she does not need to supplement or pump; just latch..   (Her full milk supply is coming in).     Parents expect to be going home today depending on baby labs.     They will follow up with Dr. Gonzalez lactation but, also has  lactation contact information, if needed.     She has a breast pump for at home.   Questions answered.   PI-123 and [facility] pump cleaning guide given     Encouraged her to call for assistance prior to discharge, if needed.

## 2024-06-03 NOTE — CARE PLAN
Problem: Pain  Goal: My pain/discomfort is manageable  Outcome: Met     Problem: Safety  Goal: Patient will be injury free during hospitalization  Outcome: Met  Goal: I will remain free of falls  Outcome: Met     Problem: Daily Care  Goal: Daily care needs are met  Outcome: Met     Problem: Psychosocial Needs  Goal: Demonstrates ability to cope with hospitalization/illness  Outcome: Met  Goal: Collaborate with me, my family, and caregiver to identify my specific goals  Outcome: Met     Problem: Discharge Barriers  Goal: My discharge needs are met  Outcome: Met       Patient meets all discharge requirements and has been cleared for discharge today

## 2024-06-03 NOTE — DISCHARGE SUMMARY
Discharge Summary    Admission Date: 2024  Discharge Date: 24  Discharge Diagnosis: Encounter for induction of labor ([facility])     Patient Active Problem List   Diagnosis    Allergic rhinitis    Amblyopia    Prenatal care in third trimester ([facility])    Mild intermittent asthma without complication ([facility])    Costochondritis    37 weeks gestation of pregnancy (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)    Decreased fetal movements in third trimester ([facility])    PONV (postoperative nausea and vomiting)       Hospital Course  Lavonne Booth is a 33 y.o.,     Initially presented for: DFM @ term --> IOL    Admission Date: 2024    Delivery Date: 2024  12:00 AM     Delivery type: Vaginal, Spontaneous      GA at delivery: 37w5d    Outcome: Living     Anesthesia during delivery: Epidural     Intrapartum complications: None     Feeding method: Breastfeeding Status: Yes    Contraception: Defers contraception to primary OB/PP visit. We discussed pregnancy spacing of at least one year, abstaining from intercourse for 6wks, and the ability to become pregnant in the absence of regular menses. Pt verbalized understanding.     Rhogam: The patient's blood type is O POS. The baby's blood type is O POS . Rhogam is not indicated.     Now postpartum day: 4.    Hospital course n/f:    Orthopnea, SOB  - improving  - EKG NSR  - CXR revealed mild interstitial pulmonary edema -> Lasix 20mg IV   - BNP elevated at 436 -> echo WNL  - afebrile, no leukocytosis, SpO2 >96%  - continue PO lasix 20mg daily x4 more days     IAI (resolved)  - s/p Amp/gent intrapartum  - VSS, afebrile, fundus appropriately tender  - Crt 0.82 -> 1.00 -> 0.96 (0.69 baseline)     Acute blood loss anemia  - HMG 11.4 ->  -> PPD2 10.1 -> PPD3 9.8  - orthopnea, otherwise asymptomatic  - for  PO Fe at discharge    PP course otherwise uneventful.  Meeting all postpartum milestones- ambulating independently, passing flatus, tolerating PO intake, lochia light, voiding spontaneously, and pain well controlled with PO meds.     Dispo  OK for DC today  6 week postpartum follow-up with prenatal provider.     Pertinent Physical Exam At Time of Discharge  General: well appearing, well nourished, postpartum  Obstetric: fundus firm below umbilicus, lochia light  Skin: Warm, dry; no rashes/lesions/erythema  Breast: No masses, nipple discharge  Neuro: A/Ox3, conversational, no gross motor deficit   GI: no distension, appropriately tender, soft, +BS  Respiratory: Even and unlabored on RA, LSCTA BL  Cardiovascular: Trace BL ankle edema; No erythema, warmth  Psych: appropriate mood and affect       Your medication list        START taking these medications        Instructions Last Dose Given Next Dose Due   acetaminophen 325 mg tablet  Commonly known as: Tylenol      Take 3 tablets (975 mg) by mouth every 6 hours.       ibuprofen 600 mg tablet      Take 1 tablet (600 mg) by mouth every 6 hours.              CONTINUE taking these medications        Instructions Last Dose Given Next Dose Due   cyclobenzaprine 10 mg tablet  Commonly known as: Flexeril      Take 1 tablet (10 mg) by mouth 3 times a day as needed for muscle spasms.       mupirocin 2 % cream  Commonly known as: Bactroban      Apply topically 3 times a day.       prenatal vitamin (iron-folic) 27 mg iron-800 mcg folic acid tablet      Take 1 tablet by mouth once daily.              STOP taking these medications      valACYclovir 500 mg tablet  Commonly known as: Valtrex                  Where to Get Your Medications        These medications were sent to [facility] - [place], [place] - [address]  [address], [place] [address]      Phone: [phone]   acetaminophen 325 mg tablet  ibuprofen 600 mg tablet           Outpatient Follow-Up  Future  Appointments   Date Time Provider Department Center   6/4/2024  3:30 PM MONSERRAT Dash, APRN-CNP [id number]   6/11/2024  3:45 PM MONSERRAT Irving [id number]       DAVID Castro

## 2024-06-04 ENCOUNTER — APPOINTMENT (OUTPATIENT)
Dept: OBSTETRICS AND GYNECOLOGY | Facility: HOSPITAL | Age: 34
End: 2024-06-04
Payer: COMMERCIAL

## 2024-06-11 ENCOUNTER — APPOINTMENT (OUTPATIENT)
Dept: OBSTETRICS AND GYNECOLOGY | Facility: HOSPITAL | Age: 34
End: 2024-06-11
Payer: COMMERCIAL

## 2024-06-13 LAB
ATRIAL RATE: 60 BPM
P AXIS: -10 DEGREES
P OFFSET: 193 MS
P ONSET: 152 MS
PR INTERVAL: 146 MS
Q ONSET: 225 MS
QRS COUNT: 9 BEATS
QRS DURATION: 72 MS
QT INTERVAL: 386 MS
QTC CALCULATION(BAZETT): 386 MS
QTC FREDERICIA: 386 MS
R AXIS: 58 DEGREES
T AXIS: 39 DEGREES
T OFFSET: 418 MS
VENTRICULAR RATE: 60 BPM

## 2024-07-09 ENCOUNTER — POSTPARTUM VISIT (OUTPATIENT)
Dept: OBSTETRICS AND GYNECOLOGY | Facility: HOSPITAL | Age: 34
End: 2024-07-09
Payer: COMMERCIAL

## 2024-07-09 VITALS
HEIGHT: 67 IN | SYSTOLIC BLOOD PRESSURE: 127 MMHG | WEIGHT: 165 LBS | BODY MASS INDEX: 25.9 KG/M2 | DIASTOLIC BLOOD PRESSURE: 80 MMHG

## 2024-07-09 DIAGNOSIS — N90.89 VULVAR ADHESIONS: ICD-10-CM

## 2024-07-09 DIAGNOSIS — F41.9 ANXIETY: ICD-10-CM

## 2024-07-09 DIAGNOSIS — R30.0 DYSURIA: ICD-10-CM

## 2024-07-09 DIAGNOSIS — M62.89 HIGH-TONE PELVIC FLOOR DYSFUNCTION: ICD-10-CM

## 2024-07-09 PROCEDURE — 87086 URINE CULTURE/COLONY COUNT: CPT | Performed by: STUDENT IN AN ORGANIZED HEALTH CARE EDUCATION/TRAINING PROGRAM

## 2024-07-09 SDOH — ECONOMIC STABILITY: FOOD INSECURITY: WITHIN THE PAST 12 MONTHS, YOU WORRIED THAT YOUR FOOD WOULD RUN OUT BEFORE YOU GOT MONEY TO BUY MORE.: NEVER TRUE

## 2024-07-09 SDOH — ECONOMIC STABILITY: FOOD INSECURITY: WITHIN THE PAST 12 MONTHS, THE FOOD YOU BOUGHT JUST DIDN'T LAST AND YOU DIDN'T HAVE MONEY TO GET MORE.: NEVER TRUE

## 2024-07-09 ASSESSMENT — ENCOUNTER SYMPTOMS
DEPRESSION: 0
LOSS OF SENSATION IN FEET: 0
OCCASIONAL FEELINGS OF UNSTEADINESS: 0

## 2024-07-09 ASSESSMENT — PAIN SCALES - GENERAL: PAINLEVEL: 1

## 2024-07-09 ASSESSMENT — EDINBURGH POSTNATAL DEPRESSION SCALE (EPDS)
I HAVE FELT SCARED OR PANICKY FOR NO GOOD REASON: YES, QUITE A LOT
I HAVE FELT SAD OR MISERABLE: NOT VERY OFTEN
I HAVE BLAMED MYSELF UNNECESSARILY WHEN THINGS WENT WRONG: YES, SOME OF THE TIME
THE THOUGHT OF HARMING MYSELF HAS OCCURRED TO ME: NEVER
I HAVE BEEN ANXIOUS OR WORRIED FOR NO GOOD REASON: YES, VERY OFTEN
THINGS HAVE BEEN GETTING ON TOP OF ME: YES, SOMETIMES I HAVEN'T BEEN COPING AS WELL AS USUAL
I HAVE BEEN ABLE TO LAUGH AND SEE THE FUNNY SIDE OF THINGS: AS MUCH AS I ALWAYS COULD
I HAVE BEEN SO UNHAPPY THAT I HAVE HAD DIFFICULTY SLEEPING: NOT AT ALL
I HAVE LOOKED FORWARD WITH ENJOYMENT TO THINGS: RATHER LESS THAN I USED TO
I HAVE BEEN SO UNHAPPY THAT I HAVE BEEN CRYING: YES, QUITE OFTEN
TOTAL SCORE: 14

## 2024-07-09 ASSESSMENT — PATIENT HEALTH QUESTIONNAIRE - PHQ9
SUM OF ALL RESPONSES TO PHQ9 QUESTIONS 1 & 2: 0
2. FEELING DOWN, DEPRESSED OR HOPELESS: NOT AT ALL
1. LITTLE INTEREST OR PLEASURE IN DOING THINGS: NOT AT ALL

## 2024-07-09 NOTE — PROGRESS NOTES
Subjective   Patient ID: Lavonne Booth is a 33 y.o. female who presents for Postpartum Follow-up (DEL 2023 vaginal/Last pap 11/10/2023 neg HPV neg/Declined chaperone FC MA).  Presents for postpartum follow up.  Physically doing okay but reporting significant anxiety/worry about baby which is resulting in poor sleep and impairing quality of life.  Of note, has experienced pain with sex in the past but had never sought treatment for same.        Review of Systems   All other systems reviewed and are negative.      Objective   Physical Exam  Constitutional:       Appearance: Normal appearance.   HENT:      Head: Normocephalic and atraumatic.      Nose: Nose normal.      Mouth/Throat:      Mouth: Mucous membranes are moist.      Pharynx: No oropharyngeal exudate or posterior oropharyngeal erythema.   Eyes:      Extraocular Movements: Extraocular movements intact.      Conjunctiva/sclera: Conjunctivae normal.   Pulmonary:      Effort: Pulmonary effort is normal.   Genitourinary:     General: Normal vulva.      Comments: Tenderness of BL levator complex, L>R, increased tone noted on left  Musculoskeletal:      Cervical back: Normal range of motion.   Skin:     Findings: No bruising, erythema, lesion or rash.   Neurological:      General: No focal deficit present.      Mental Status: She is alert.   Psychiatric:         Mood and Affect: Mood normal.         Behavior: Behavior normal.         Thought Content: Thought content normal.         Judgment: Judgment normal.         Assessment/Plan   - encouraged follow up with mental health provider regarding anxiety given impairment in QOL and ability to enjoy time with /life.  No alarm sx present.  Resources provided in AVS  - encouraged pelvic floor PT given high tone pelvic floor which, per hx, may have been present since prior to pregnancy  - urine culture obtained to exclude infectious contribution to sx  - intends condoms for contraception, will call if wishing  to discuss additional therapy  - follow up for well care or sooner as needed    Elli Villar MD 07/09/24 11:36 AM

## 2024-07-09 NOTE — PATIENT INSTRUCTIONS
-Based resources:  Edgard Dumas - psychiatrist focusing in  mood disorders  Alissa Espitia - psychologist focusing in women's mental health  Outside resource:  Lakshmi Dumas - psychologist focusing in  mental health

## 2024-07-10 LAB — BACTERIA UR CULT: NO GROWTH

## 2024-07-18 ENCOUNTER — TELEPHONE (OUTPATIENT)
Dept: OBSTETRICS AND GYNECOLOGY | Facility: HOSPITAL | Age: 34
End: 2024-07-18
Payer: COMMERCIAL

## 2024-07-18 DIAGNOSIS — N95.2 VAGINAL ATROPHY: Primary | ICD-10-CM

## 2024-07-18 NOTE — TELEPHONE ENCOUNTER
Patient called office to ask about an rx she discussed with Dr. Villar at her [facility]  Patient believes it was some kind of estrogen cream  I did not see anything in her visit note  Pharmacy verified  Will send message to Dr. Villar and call patient back  Jennifer Sprague RN

## 2024-07-19 RX ORDER — ESTRADIOL 0.1 MG/G
CREAM VAGINAL
Qty: 34 G | Refills: 3 | Status: SHIPPED | OUTPATIENT
Start: 2024-07-19

## 2024-07-19 NOTE — TELEPHONE ENCOUNTER
Called and notified patient that Dr. Villar sent her in the prescription for the Estrogen Cream to her pharmacy  Instructions for use discussed with patient  Patient also scheduled for follow up appointments in PFT  Instructed patient to call office for follow up visit if medication and therapy are not helping her symptoms or for any other concerns  Patient verbalized understanding  Encouraged patient to call office with any questions or concerns  Jennifer Sprague RN

## 2024-07-30 ENCOUNTER — TELEPHONE (OUTPATIENT)
Dept: OBSTETRICS AND GYNECOLOGY | Facility: HOSPITAL | Age: 34
End: 2024-07-30
Payer: COMMERCIAL

## 2024-07-30 DIAGNOSIS — F41.8 POSTPARTUM ANXIETY (HHS-HCC): Primary | ICD-10-CM

## 2024-07-31 NOTE — TELEPHONE ENCOUNTER
Patient called office today to request referral to see Dr. Edgard Dumas, she was unable to schedule an appointment without referral  Referral pended to provider for signing  Will notify patient when referral is in so she can schedule appointment  Jennifer Sprague RN

## 2024-08-02 ENCOUNTER — EVALUATION (OUTPATIENT)
Dept: PHYSICAL THERAPY | Facility: CLINIC | Age: 34
End: 2024-08-02
Payer: COMMERCIAL

## 2024-08-02 DIAGNOSIS — N90.89 VULVAR ADHESIONS: ICD-10-CM

## 2024-08-02 DIAGNOSIS — M62.89 HIGH-TONE PELVIC FLOOR DYSFUNCTION: ICD-10-CM

## 2024-08-02 PROCEDURE — 97535 SELF CARE MNGMENT TRAINING: CPT | Mod: GP | Performed by: PHYSICAL THERAPIST

## 2024-08-02 PROCEDURE — 97162 PT EVAL MOD COMPLEX 30 MIN: CPT | Mod: GP | Performed by: PHYSICAL THERAPIST

## 2024-08-02 NOTE — PROGRESS NOTES
Physical Therapy  Physical Therapy Pelvic Floor Evaluation    Name: Lavonne Booth  MRN: [id number]  : 1990  Today's Date: 24          Insurance: MMO, PT+OT  Visit # 1 of 40 for     Current Problem:  1. High-tone pelvic floor dysfunction  Referral to Physical Therapy      2. Vulvar adhesions  Referral to Physical Therapy          General  Reason for Referral: high tone PF  Referred By: Elli Villar Fall Risk Score (The score of 4 or more indicates an increased risk of falling): 0  Precautions Comment: PP: 24  Vital Signs     Pain       Subjective  Chief Complaint:   - PP: 24  - grade 2 tearing with stitches  - tightness of PF and pain with sex, even before pregnancy  - using a topical estrogen cream for adhesions around stitches  - sometimes bilateral groin area with lifting leg a certain way/movements   - no pain with tampon use - dislikes paps and speculum due to discomfort  - no BC and no menstruating (irregular)  - L5 stress fx, 10 years ago & HS - back brace  Onset: years  SHREYA: unknown    Current Condition: worse    PAIN  Intensity (0-10): 0  Location:   Description:     Aggravating Factors:  intercourse  Relieving Factors: avoidance    Relevant Information (PMH & Previous Tests/Imaging): none  Previous Interventions/Treatments: estrogen cream (daily at night - cream on vulva)    Prior Level of Function (PLOF)  Exercise/Physical Activity: class, lifting and cardio (75%, 120# dead lifting) - HIIT - previously  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 No  Prolapse? No    Painful [place] or vaginal penetration? Yes  Frequency? 75% of the time  Positional? Doesn't try them  Marinoff Scale? 3   0 - no  pain during intercourse    1 - pain during intercourse that does not prevent intercourse    2 - pain during intercourse that interrupts intercourse    3 - pain that prevents intercourse     BLADDER  Frequency of Urination  Daytime: 5x  Nighttime: 1  Screening = Blood in urine? no Painful urination? no Recurrent infections/UTIs? No  Other Symptoms  X Trouble initiating urine stream   X Urine stream is intermittent or slow   X Trouble emptying bladder completely   X Dribbling after urination   X Straining or pushing to empty    Trouble feeling bladder urge/fullness    Trouble holding urine when you get an urge   Urinary Incontinence/Leakage - no  Average Fluid Intake (glasses/day): water, breastfeeding (3-4x32oz)    BOWEL  Frequency of Bowel Movements: regular, daily  Average Fiber Intake (per day): fruits, veggies, whole grains       Objective  Patient was educated on pelvic floor anatomy, function, and dysfunction.   Patient was educated on an orthopedic assessment & external pelvic floor assessment technique and verbalized consent.   The patient is aware they have full autonomy and can stop the assessment at any time.     Standing Assessment:   Posture: forward head, rounded shoulders  SL Stance: wnl  DL Squat: PPT  SL Squat: dkv and hip drop  Standing Lumbar Mobility: moderate tightness all directions    Supine Movement Assessment   SLR: wnl  Bridge: mild hip drop with march  Hip PROM: moderate tightness bilaterally  MMT: sup hip: 5/5    Supine Mobility Assessment  - Hamstrings: moderate tightness  - Piriformis: moderate tightness    Assess Abdomen  Diastasis Recti: negative  Sven + Head Lift: functional closure    OUTCOMES MEASURE:  NIH-CPSI  -Pain: 11  - Urinary Symp: 0  - QOL: 8    Goals:   Active       PT Problem       PT Goal 1       Start:  08/02/24    Expected End:  10/31/24       Patient will return to prior level of function with minimal to no pain and without limitations for participation in ADLs, health,  and exercise.   Patient demonstrate home exercise program adherence to supplement symptom reduction and functional gains made in clinic  Patient will reports minimal to no pain for participation in ADLs and return to PLOF.   Patient will demonstrate coordination of pelvic floor, strength & relaxation wnl for return to ADLs and PLOF without restriction.   Pt will demonstrate improvement on the outcome measure score to demonstrate overall improved functional abilities and quality of life.              Education: home exercise program, plan of care, activity modifications, pain management, and injury pathology  Bladder Habits: Just in Case Pee, Hover over the toilet, relax & breathe, squatty potty use  Hydration Recommendation: 50% of your body wt (pounds) in fluid ounces  Bladder Irritants: caffeine, artificial sweeteners, carbonated beverages, alcohol  Fiber Intake Recommendation: 25-30g/day    Treatments: education  - tummy time 5-10 min per day    Access Code: [email or web address]  - Supine Lower Trunk Rotation  - 3-5 x weekly - 10-30 reps  - Hooklying Single Knee to Chest Stretch  - 3-5 x weekly - 3 sets - 10 seconds hold  - Supine Pelvic Floor Stretch  - 3-5 x weekly - 3 sets - 5 breaths hold  - Supine Gluteus Stretch  - 3-5 x weekly - 3 sets - 20 seconds hold  - Supine Figure 4 Piriformis Stretch  - 3-5 x weekly - 3 sets - 20 seconds hold    Plan for Next Visit:   - hip/core/PF stretching program continued  - diaphragmatic breathing  - internal assessment and dilator program (assess adhesions and release)    Assessment: Patient presents with signs and symptoms consistent with high tone pelvic floor, dyspareunia, muscle tightness, resulting in limited participation in pain-free ADLs and inability to perform at their prior level of function. Pt would benefit from physical therapy to address the impairments found & listed previously in the objective section in order to return to safe and pain-free ADLs and prior level of  function.    Plan:   Planned Interventions include: therapeutic exercise, self-care home management, manual therapy, therapeutic activities, gait training, neuromuscular coordination, aquatic therapy  Patient and/or family understands and agrees with goals and plan documented: yes  Frequency: 2x/month  Duration: 2-4 months     Kanika Mckeon, PT

## 2024-08-08 ENCOUNTER — TREATMENT (OUTPATIENT)
Dept: PHYSICAL THERAPY | Facility: CLINIC | Age: 34
End: 2024-08-08
Payer: COMMERCIAL

## 2024-08-08 DIAGNOSIS — N90.89 VULVAR ADHESIONS: ICD-10-CM

## 2024-08-08 DIAGNOSIS — M62.89 HIGH-TONE PELVIC FLOOR DYSFUNCTION: Primary | ICD-10-CM

## 2024-08-08 PROCEDURE — 97110 THERAPEUTIC EXERCISES: CPT | Mod: GP | Performed by: PHYSICAL THERAPIST

## 2024-08-08 NOTE — PROGRESS NOTES
Physical Therapy  Physical Therapy Pelvic Floor Evaluation    Name: Lavonne Booth  MRN: [id number]  : 1990  Today's Date: 24          Insurance: MMO, PT+OT  Visit # 2 of 40 for     Current Problem:  1. High-tone pelvic floor dysfunction        2. Vulvar adhesions              General     Precautions     Vital Signs     Pain       Subjective  Chief Complaint:   - PP: 24  - grade 2 tearing with stitches  - tightness of PF and pain with sex, even before pregnancy  - using a topical estrogen cream for adhesions around stitches  - sometimes bilateral groin area with lifting leg a certain way/movements   - no pain with tampon use - dislikes paps and speculum due to discomfort  - no BC and no menstruating (irregular)  - L5 stress fx, 10 years ago & HS - back brace  Onset: years  SHREYA: unknown    Today:   - power is out  - no pain except with intercourse, which she hasn't attempted yet, PP    PAIN  Intensity (0-10): 0  Location:   Description:     Prior Level of Function (PLOF)  Exercise/Physical Activity: class, lifting and cardio (75%, 120# dead lifting) - HIIT - previously  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 Yes  Frequency? 75% of the time  Positional? Doesn't try them  Marinoff Scale? 3  BLADDER: symptoms of tight pelvic floor    Objective  Posture: forward head, rounded shoulders  SL Stance: wnl  DL Squat: PPT  SL Squat: dkv and hip drop  Standing Lumbar Mobility: moderate tightness all directions  SLR: wnl  Bridge: mild hip drop with march  Hip PROM: moderate tightness bilaterally  MMT: sup hip: 5/5  - Hamstrings: moderate tightness  - Piriformis: moderate tightness  Diastasis Recti: negative  Sven + Head Lift: functional closure    Treatments:  Access Code: [email or web address]  - Supine Diaphragmatic Breathing  - 1-3 x daily -  3-5 reps  - Lying Prone  - 1 x daily - 5-10 minutes hold  - Supine Lower Trunk Rotation  - 3-5 x weekly - 10-30 reps  - Hooklying Single Knee to Chest Stretch  - 3-5 x weekly - 3 sets - 10 seconds hold  - Supine Pelvic Floor Stretch  - 3-5 x weekly - 3 sets - 5 breaths hold  - Supine Gluteus Stretch  - 3-5 x weekly - 3 sets - 20 seconds hold  - Supine Figure 4 Piriformis Stretch  - 3-5 x weekly - 3 sets - 20 seconds hold  - Supine Hamstring Stretch with Strap  - 3-5 x weekly - 3 sets - 20 seconds hold  - Supine Butterfly Groin Stretch  - 3-5 x weekly - 3 sets - 20 seconds hold  - Sidelying Thoracic Rotation with Open Book  - 3-5 x weekly - 5 sets - 10 seconds hold  - Cat Cow  - 3-5 x weekly - 10 reps  - Child's Pose Stretch  - 3-5 x weekly - 1-3 minutes hold  - Cobra  - 3-5 x weekly - 5-10 reps  - Prone Quadriceps Stretch with Strap  - 3-5 x weekly - 3 sets - 20 seconds hold  - Half Kneeling Hip Flexor Stretch  - 3-5 x weekly - 3 sets - 20 seconds hold    Plan for Next Visit:   - hip/core/PF stretching program continued  - diaphragmatic breathing  - internal assessment and dilator program next visit (assess adhesions and release)    Assessment: Patient presents with signs and symptoms consistent with high tone pelvic floor, dyspareunia, muscle tightness, resulting in limited participation in pain-free ADLs and inability to perform at their prior level of function. Pt would benefit from physical therapy to address the impairments found & listed previously in the objective section in order to return to safe and pain-free ADLs and prior level of function.  - tolerated breathing and stretching exercises well without increased pain or symptoms    Plan:   Planned Interventions include: therapeutic exercise, self-care home management, manual therapy, therapeutic activities, gait training, neuromuscular coordination, aquatic therapy  Patient and/or family understands and agrees with goals and plan documented:  yes  Frequency: 2x/month  Duration: 2-4 months     Kanika Mckeon, PT

## 2024-08-19 ENCOUNTER — TREATMENT (OUTPATIENT)
Dept: PHYSICAL THERAPY | Facility: CLINIC | Age: 34
End: 2024-08-19
Payer: COMMERCIAL

## 2024-08-19 DIAGNOSIS — M62.89 HIGH-TONE PELVIC FLOOR DYSFUNCTION: Primary | ICD-10-CM

## 2024-08-19 DIAGNOSIS — N90.89 VULVAR ADHESIONS: ICD-10-CM

## 2024-08-27 ENCOUNTER — APPOINTMENT (OUTPATIENT)
Dept: PHYSICAL THERAPY | Facility: CLINIC | Age: 34
End: 2024-08-27
Payer: COMMERCIAL

## 2024-09-11 ENCOUNTER — TELEMEDICINE (OUTPATIENT)
Dept: BEHAVIORAL HEALTH | Facility: CLINIC | Age: 34
End: 2024-09-11
Payer: COMMERCIAL

## 2024-09-11 DIAGNOSIS — F41.8 POSTPARTUM ANXIETY (HHS-HCC): ICD-10-CM

## 2024-09-11 RX ORDER — NORTRIPTYLINE HYDROCHLORIDE 50 MG/1
50 CAPSULE ORAL NIGHTLY
Qty: 30 CAPSULE | Refills: 1 | Status: SHIPPED | OUTPATIENT
Start: 2024-09-11

## 2024-09-11 NOTE — PROGRESS NOTES
Delivery Date: 2024     HISTORY OF PRESENT ILLNESS:  Lavonne Booth is a 34 y.o. female with no past psychiatric history and no PMH presenting for post-partum anxiety after delivering baby [place] 24.     On interview, pt endorses post-partum anxiety-- no anxiety prior to pregnancy. Previously it was so bad that she was unable to sleep and stayed up worrying about whether her baby [place] was breathing. Worries about her baby's wellbeing regularly keep her awake-- averaging 3-4h/night.  She describes the delivery as traumatic, with [place]'s heart stopping several times and the looming threat of emergency . Delivery was also complicated by an infection, and Lavonne thinks the anxiety from complications during the delivery never really went away. Feels like she can't give attention to anyone but her baby. Endorses hypervigilance, quick to anger, depression, exhaustion, anhedonia.    Started nortriptyline 20 mg at bedtime  around end of July for nipple pain (resolved). Currently breastfeeding, after initially not producing milk for a while. Not currently sexually active; reports that she was stiched up too tightly after delivery, so intercourse is very painful.     PSYCHIATRIC REVIEW OF SYSTEMS  Depression: depressed mood, sleep disturbance: average hours of sleep per night 3-4, fatigue or loss of energy, markedly diminished interest or pleasure in all or most activities, tearfulness, and changes in appetite or weight leading to significant weight loss or gain unintentionally   Anxiety: excessive worry that is difficult to control, fatigue, sleep disturbance, worries of looming dangers/catastrophes, and intrusive thoughts about ways her baby might die  Nemo: negative  Psychosis: negative  Delirium: negative   Trauma: history of trauma; describes labor as traumatic     PSYCHIATRIC HISTORY  Denies prior dx, hospitalizations, suicide attempts, or     Family psychiatric history: mom with  anxiety      SUBSTANCE USE HISTORY   She reports that she has quit smoking. She has never used smokeless tobacco. She reports current alcohol use of about 1.0 standard drink of alcohol per week. She reports that she does not use drugs.    Tobacco: none recently, used to vape   Alcohol: 2 beers/week   Denies other drug use     SOCIAL HISTORY  Social History     Socioeconomic History    Marital status:    Tobacco Use    Smoking status: Former    Smokeless tobacco: Never    Tobacco comments:     Pt used vape before pregnancy    Vaping Use    Vaping status: Former    Substances: Nicotine    Devices: Disposable   Substance and Sexual Activity    Alcohol use: Yes     Alcohol/week: 1.0 standard drink of alcohol     Types: 1 Glasses of wine per week     Comment: occasional    Drug use: Never    Sexual activity: Yes     Partners: Male     Social Determinants of Health     Financial Resource Strain: Low Risk  (5/28/2024)    Overall Financial Resource Strain (CARDIA)     Difficulty of Paying Living Expenses: Not hard at all   Food Insecurity: No Food Insecurity (7/9/2024)    Hunger Vital Sign     Worried About Running Out of Food in the Last Year: Never true     Ran Out of Food in the Last Year: Never true   Transportation Needs: No Transportation Needs (5/28/2024)    PRAPARE - Transportation     Lack of Transportation (Medical): No     Lack of Transportation (Non-Medical): No   Stress: Stress Concern Present (7/9/2024)    [employer] [place] [employer] - Occupational Stress Questionnaire     Feeling of Stress : To some extent   Intimate Partner Violence: Not At Risk (7/9/2024)    Humiliation, Afraid, Rape, and Kick questionnaire     Fear of Current or Ex-Partner: No     Emotionally Abused: No     Physically Abused: No     Sexually Abused: No      Current living situation: lives with    Current employment/source of income: recent delivery     Born and raised: [place]   Family: no family around to help  "  Employment: works as physical therapist at , full time   Legal history: physical control (was sleeping in her car after a night of drinking)  Access to weapons:  owns guns (not locked but in gun case)    PAST MEDICAL HISTORY  Past Medical History:   Diagnosis Date    Dizziness 11/10/2023    Dysmenorrhea 07/07/2021    Fibrocystic breast 07/07/2021    Need for immunization against influenza 11/10/2023    S/p flu 11/10/023    PMS (premenstrual syndrome) 07/07/2021        Prior Head trauma/TBI/LOC/seizure history: none  Ob/Gyn history: one pregnancy, vaginal delivery   LMP/contraception: not sexually active right now     PAST SURGICAL HISTORY  Past Surgical History:   Procedure Laterality Date    EYE SURGERY            FAMILY HISTORY  Family History   Problem Relation Name Age of Onset    Cervical cancer Mother      Breast cancer Mother's Sister      Breast cancer Mother's Sister          ALLERGIES  Azithromycin and Definity [perflutren lipid microspheres]    Some components of the patient's history were obtained through personal review of the patient's available medical records.    OARRS REVIEW  OARRS checked: no activity     OBJECTIVE    VITALS      6/2/2024     9:14 PM 6/3/2024     1:07 AM 6/3/2024     6:12 AM 6/3/2024     7:42 AM 6/3/2024     9:00 AM 6/3/2024    11:03 AM 7/9/2024    11:14 AM   Vitals   Systolic 133 125 128 143 124 127 127   Diastolic 83 81 79 83 80 75 80   Heart Rate 56 66 60 63 60 62    Temp 37 °C (98.6 °F) 36.8 °C (98.2 °F) 36.3 °C (97.3 °F) 36.4 °C (97.5 °F) 36.6 °C (97.9 °F) 36.7 °C (98.1 °F)    Resp 16 18 18 16 16 16    Height (in)       1.702 m (5' 7\")   Weight (lb)       165   BMI       25.84 kg/m2   BSA (m2)       1.88 m2        MENTAL STATUS EXAM  Appearance: sitting in car for virtual appt; quickly switched to phone call given poor video/audio resolution  Attitude: tense, in a hurry to return to work but cooperative   Behavior: can't comment due to limited video   Motor " "Activity: can't comment due to limited video   Speech: spontaneous, regular rate/rhythm/volume  Mood: \"anxious\"  Affect: tense, became tearful when asked whether she needed to reschedule due to her hurry to return to work   Thought Process: linear and goal-oriented  Thought Content:  Denies suicidal or violent ideation   Thought Perception: Doesn't appear internally stimulated   Cognition: can name medications, oriented to situation   Insight: fair  Judgement: fair     PHYSICAL EXAM  N/a    HOME MEDICATIONS  Medication Documentation Review Audit       Reviewed by Melissa Barlow RN (Registered Nurse) on 05/28/24 at 2007      Medication Order Taking? Sig Documenting Provider Last Dose Status   cyclobenzaprine (Flexeril) 10 mg tablet [id number]  Take 1 tablet (10 mg) by mouth 3 times a day as needed for muscle spasms. Jennifer Gee MD  Active   mupirocin (Bactroban) 2 % cream [id number]  Apply topically 3 times a day. Elli Villar MD  Active   prenatal vitamin, iron-folic, (prenatal vit no.130-iron-folic) 27 mg iron-800 mcg folic acid tablet [id number]  Take 1 tablet by mouth once daily. Jose Elias Mendoza MD  Active   valACYclovir (Valtrex) 500 mg tablet [id number]  Take 1 tablet (500 mg) by mouth twice daily for 3 days. Elli Villar MD  Active                     CURRENT MEDICATIONS  Nortriptyline 20 mg at bedtime     LABS  No results found for this or any previous visit (from the past 24 hour(s)).     IMAGING  No results found.     PSYCHIATRIC RISK ASSESSMENT  Violence Risk Factors:  stress/destabilizers  Acute Risk of Harm to Others is Considered: Low  Suicide Risk Factors: history of trauma or abuse, current psychiatric illness, and access to weapons  Protective Factors: sense of responsibility towards family, social support/connectedness, and child-related concerns/living with child < 18 yrs age  Acute Risk of Harm to Self is Considered: Low    ASSESSMENT AND PLAN  Lavonne Booth is a 34 y.o. female " with postpartum anxiety and postpartum depression, 4 months post-partum, presenting for post-partum anxiety after delivering baby [place]anne 5/30/24.     On 9/11/24 initial interview, she reports that her delivery was traumatic and she feels like she's remained very anxious ever since, although she doesn't feel like she had any prior problems with anxiety. Anxious worries about her baby not breathing, for example, keep her up, and she's sleeping only 4 hours a night on average. She denies any panic attacks but endorses many sx of depression, including anhedonia, low energy, irritability, low appetite, and crying every day. She's breastfeeding and taking nortriptyline 20 mg at bedtime for nipple pain, which has been effective. When we discussed the options to either increase nortriptyline or start an SSRI, Lavonne opted to increase the dose of nortriptyline.     IMPRESSION  #Post-partum anxiety  #Post-partum depression     RECOMMENDATIONS  -INCREASE nortriptyline to 50 mg at bedtime; advised to monitor for dry mouth   -RTC in 4 weeks    Patient seen and discussed with Dr. Dumas, who agrees with above plan.    Latonya Wright MD

## 2024-10-08 ENCOUNTER — TREATMENT (OUTPATIENT)
Dept: PHYSICAL THERAPY | Facility: CLINIC | Age: 34
End: 2024-10-08
Payer: COMMERCIAL

## 2024-10-08 DIAGNOSIS — N90.89 VULVAR ADHESIONS: ICD-10-CM

## 2024-10-08 DIAGNOSIS — M62.89 HIGH-TONE PELVIC FLOOR DYSFUNCTION: ICD-10-CM

## 2024-10-08 PROCEDURE — 97535 SELF CARE MNGMENT TRAINING: CPT | Mod: GP | Performed by: PHYSICAL THERAPIST

## 2024-10-08 NOTE — PROGRESS NOTES
Physical Therapy  Physical Therapy Pelvic Floor    Name: Lavonne Booth  MRN: [id number]  : 1990  Today's Date: 10/08/24          Insurance: MMO, PT+OT  Visit # 3 of 40 for     Current Problem:  1. High-tone pelvic floor dysfunction  Follow Up In Physical Therapy      2. Vulvar adhesions  Follow Up In Physical Therapy            General     Precautions     Vital Signs     Pain       Subjective  Chief Complaint:   - PP: 24  - grade 2 tearing with stitches  - tightness of PF and pain with sex, even before pregnancy  - using a topical estrogen cream for adhesions around stitches  - sometimes bilateral groin area with lifting leg a certain way/movements   - no pain with tampon use - dislikes paps and speculum due to discomfort  - no BC and no menstruating (irregular)  - L5 stress fx, 10 years ago & HS - back brace  Onset: years  SHREYA: unknown    Today:   - attempted intercourse, painful  - hep compliance: yes, happy baby, hamstring stretch    PAIN  Intensity (0-10): 0  Location:   Description:     Prior Level of Function (PLOF)  Exercise/Physical Activity: class, lifting and cardio (75%, 120# dead lifting) - HIIT - previously  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 Yes, Frequency? 75% of the time, Marinoff Scale? 3  BLADDER: symptoms of tight pelvic floor    Objective  Posture: forward head, rounded shoulders  SL Stance: wnl  DL Squat: PPT  SL Squat: dkv and hip drop  Standing Lumbar Mobility: moderate tightness all directions  SLR: wnl  Bridge: mild hip drop with march  Hip PROM: moderate tightness bilaterally  MMT: sup hip: 5/5  - Hamstrings: moderate tightness  - Piriformis: moderate tightness  Diastasis Recti: negative  Sven + Head Lift: functional closure    Treatments:  - discussed dilator program    Access Code: [email or web address] -  STRETCHING  - Supine Diaphragmatic Breathing  - 1-3 x daily - 3-5 reps  - Lying Prone  - 1 x daily - 5-10 minutes hold  - Supine Lower Trunk Rotation  - 3-5 x weekly - 10-30 reps  - Hooklying Single Knee to Chest Stretch  - 3-5 x weekly - 3 sets - 10 seconds hold  - Supine Pelvic Floor Stretch  - 3-5 x weekly - 3 sets - 5 breaths hold  - Supine Gluteus Stretch  - 3-5 x weekly - 3 sets - 20 seconds hold  - Supine Figure 4 Piriformis Stretch  - 3-5 x weekly - 3 sets - 20 seconds hold  - Supine Hamstring Stretch with Strap  - 3-5 x weekly - 3 sets - 20 seconds hold  - Supine Butterfly Groin Stretch  - 3-5 x weekly - 3 sets - 20 seconds hold  - Sidelying Thoracic Rotation with Open Book  - 3-5 x weekly - 5 sets - 10 seconds hold  - Cat Cow  - 3-5 x weekly - 10 reps  - Child's Pose Stretch  - 3-5 x weekly - 1-3 minutes hold  - Cobra  - 3-5 x weekly - 5-10 reps  - Prone Quadriceps Stretch with Strap  - 3-5 x weekly - 3 sets - 20 seconds hold  - Half Kneeling Hip Flexor Stretch  - 3-5 x weekly - 3 sets - 20 seconds hold    Plan for Next Visit: assess response to dilator program  - hip/core/PF stretching program continued  - diaphragmatic breathing    Assessment: Patient presents with signs and symptoms consistent with high tone pelvic floor, dyspareunia, muscle tightness, resulting in limited participation in pain-free ADLs and inability to perform at their prior level of function. Pt would benefit from physical therapy to address the impairments found & listed previously in the objective section in order to return to safe and pain-free ADLs and prior level of function.  - continued pain with penetration and intercourse, patient educated in dilator program and hep    Plan:   Planned Interventions include: therapeutic exercise, self-care home management, manual therapy, therapeutic activities, gait training, neuromuscular coordination, aquatic therapy  Patient and/or family understands and agrees with goals and plan  documented: yes  Frequency: 2x/month  Duration: 2-4 months     Kanika Mckeon, PT

## 2024-10-22 ENCOUNTER — TREATMENT (OUTPATIENT)
Dept: PHYSICAL THERAPY | Facility: CLINIC | Age: 34
End: 2024-10-22
Payer: COMMERCIAL

## 2024-10-22 DIAGNOSIS — N90.89 VULVAR ADHESIONS: ICD-10-CM

## 2024-10-22 DIAGNOSIS — M62.89 HIGH-TONE PELVIC FLOOR DYSFUNCTION: ICD-10-CM

## 2024-10-22 PROCEDURE — 97535 SELF CARE MNGMENT TRAINING: CPT | Mod: GP | Performed by: PHYSICAL THERAPIST

## 2024-10-22 NOTE — PROGRESS NOTES
Physical Therapy  Physical Therapy Pelvic Floor    Name: Lavonne Booth  MRN: [id number]  : 1990  Today's Date: 10/22/24     Time Calculation  Start Time: 0840  Stop Time: 0900  Time Calculation (min): 20 min    Insurance: MMO, PT+OT  Visit # 4 of 40 for     Current Problem:  1. High-tone pelvic floor dysfunction  Follow Up In Physical Therapy      2. Vulvar adhesions  Follow Up In Physical Therapy            General     Precautions     Vital Signs     Pain       Subjective  Chief Complaint:   - PP: 24  - grade 2 tearing with stitches  - tightness of PF and pain with sex, even before pregnancy  - using a topical estrogen cream for adhesions around stitches  - sometimes bilateral groin area with lifting leg a certain way/movements   - no pain with tampon use - dislikes paps and speculum due to discomfort  - no BC and no menstruating (irregular)  - L5 stress fx, 10 years ago & HS - back brace  Onset: years  SHREYA: unknown    Today:   - used 2nd size dilator a couple of times with cramping sensation felt during, but not after  - was told she has adhesions and feels most pain at 6 o'clock  - hep compliance: yes, happy baby, hamstring stretch    PAIN  Intensity (0-10): 0  Location:   Description:     Prior Level of Function (PLOF)  Exercise/Physical Activity: class, lifting and cardio (75%, 120# dead lifting) - HIIT - previously  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 Yes, Frequency? 75% of the time, Marinoff Scale? 3  BLADDER: symptoms of tight pelvic floor    Objective  Posture: forward head, rounded shoulders  SL Stance: wnl  DL Squat: PPT  SL Squat: dkv and hip drop  Standing Lumbar Mobility: moderate tightness all directions  SLR: wnl  Bridge: mild hip drop with march  Hip PROM: moderate tightness bilaterally  MMT: sup hip:  5/5  - Hamstrings: moderate tightness  - Piriformis: moderate tightness  Diastasis Recti: negative  Sven + Head Lift: functional closure    Treatments:  - discussed dilator program  - discussed perineal massage/scar massage at 6 o'clock/perineal body (external and internal)  - discussed using smallest dilator at a reduced depth for 1-3 sessions without pain or cramping prior to attempting size 2 again  - will keep me updated    Access Code: [email or web address] [email or web address]  - Supine Diaphragmatic Breathing  - 1-3 x daily - 3-5 reps  - Lying Prone  - 1 x daily - 5-10 minutes hold  - Supine Lower Trunk Rotation  - 3-5 x weekly - 10-30 reps  - Hooklying Single Knee to Chest Stretch  - 3-5 x weekly - 3 sets - 10 seconds hold  - Supine Pelvic Floor Stretch  - 3-5 x weekly - 3 sets - 5 breaths hold  - Supine Gluteus Stretch  - 3-5 x weekly - 3 sets - 20 seconds hold  - Supine Figure 4 Piriformis Stretch  - 3-5 x weekly - 3 sets - 20 seconds hold  - Supine Hamstring Stretch with Strap  - 3-5 x weekly - 3 sets - 20 seconds hold  - Supine Butterfly Groin Stretch  - 3-5 x weekly - 3 sets - 20 seconds hold  - Sidelying Thoracic Rotation with Open Book  - 3-5 x weekly - 5 sets - 10 seconds hold  - Cat Cow  - 3-5 x weekly - 10 reps  - Child's Pose Stretch  - 3-5 x weekly - 1-3 minutes hold  - Cobra  - 3-5 x weekly - 5-10 reps  - Prone Quadriceps Stretch with Strap  - 3-5 x weekly - 3 sets - 20 seconds hold  - Half Kneeling Hip Flexor Stretch  - 3-5 x weekly - 3 sets - 20 seconds hold    Plan for Next Visit: assess response to dilator program  - hip/core/PF stretching program continued  - diaphragmatic breathing    Assessment: Patient presents with signs and symptoms consistent with high tone pelvic floor, dyspareunia, muscle tightness, resulting in limited participation in pain-free ADLs and inability to perform at their prior level of function. Pt would benefit from physical therapy to address the impairments found & listed previously in  the objective section in order to return to safe and pain-free ADLs and prior level of function.  - continued pain with penetration and intercourse, patient educated in dilator program and hep    Plan:   Planned Interventions include: therapeutic exercise, self-care home management, manual therapy, therapeutic activities, gait training, neuromuscular coordination, aquatic therapy  Patient and/or family understands and agrees with goals and plan documented: yes  Frequency: 2x/month  Duration: 2-4 months     Kanika Mckeon, PT

## 2024-12-03 ENCOUNTER — TELEMEDICINE CLINICAL SUPPORT (OUTPATIENT)
Dept: PHYSICAL THERAPY | Facility: CLINIC | Age: 34
End: 2024-12-03
Payer: COMMERCIAL

## 2024-12-03 DIAGNOSIS — N90.89 VULVAR ADHESIONS: ICD-10-CM

## 2024-12-03 DIAGNOSIS — M62.89 HIGH-TONE PELVIC FLOOR DYSFUNCTION: ICD-10-CM

## 2024-12-03 PROCEDURE — 97535 SELF CARE MNGMENT TRAINING: CPT | Mod: GP | Performed by: PHYSICAL THERAPIST

## 2024-12-03 NOTE — PROGRESS NOTES
Physical Therapy  Physical Therapy Pelvic Floor  Virtual Visit    Name: Lavonne Booth  MRN: [id number]  : 1990  Today's Date: 24     Time Calculation  Start Time: 1030  Stop Time: 1100  Time Calculation (min): 30 min    Insurance: MMO, PT+OT  Visit # 5 of 40 for     Current Problem:  1. High-tone pelvic floor dysfunction  Follow Up In Physical Therapy      2. Vulvar adhesions  Follow Up In Physical Therapy          General     Precautions     Vital Signs     Pain       Subjective  Chief Complaint:   - PP: 24  - grade 2 tearing with stitches  - tightness of PF and pain with sex, even before pregnancy  - using a topical estrogen cream for adhesions around stitches  - sometimes bilateral groin area with lifting leg a certain way/movements   - no pain with tampon use - dislikes paps and speculum due to discomfort  - no BC and no menstruating (irregular)  - L5 stress fx, 10 years ago & HS - back brace  Onset: years  SHREYA: unknown    10/22/24:   - used 2nd size dilator a couple of times with cramping sensation felt during, but not after  - was told she has adhesions and feels most pain at 6 o'clock  - hep compliance: yes, happy baby, hamstring stretch    12/3/24:   - Went back to smallest dilator - cramping and nausea (takes 5 minutes for symptoms to go away)  - was able to progress to 2nd size dilator with initial stretching of size 1 dilator  - anticipates pain and senses tension prior to and during use, sometimes cramping during use, then will calm with prolonged exposure/hold and then will feel cramping when she is done  - reports increased sensitivity of her nipples as well that initially made feeding and pumping difficult, was prescribed an antidepressant that decreased nipple pain    PAIN  Intensity (0-10): 0  Location:   Description:     Prior Level of Function (PLOF)  Exercise/Physical Activity: class, lifting and cardio (75%, 120# dead lifting) - HIIT - previously  Work/School: [employer]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 Yes, Frequency? 75% of the time, Marinoff Scale? 3  BLADDER: symptoms of tight pelvic floor    Objective  Posture: forward head, rounded shoulders  SL Stance: wnl  DL Squat: PPT  SL Squat: dkv and hip drop  Standing Lumbar Mobility: moderate tightness all directions  SLR: wnl  Bridge: mild hip drop with march  Hip PROM: moderate tightness bilaterally  MMT: sup hip: 5/5  - Hamstrings: moderate tightness  - Piriformis: moderate tightness  Diastasis Recti: negative  Sven + Head Lift: functional closure    Treatments:  - discussed chronic pain and hyper-sensitivity/up-regulation of nervous system as a whole  - discussed external vulvar desensitization with gentle physical touch for 3-5min, every other day for 2 weeks, progress to every day for another 2 weeks  - utilize breathing, relaxation, and distraction for increased comfort  - discussed promotion of positive association/relationship between touch and sensation prior to use of tissue mobility or dilator use    Access Code: [email or web address] - STRETCHING  - Supine Diaphragmatic Breathing  - 1-3 x daily - 3-5 reps  - Lying Prone  - 1 x daily - 5-10 minutes hold  - Supine Lower Trunk Rotation  - 3-5 x weekly - 10-30 reps  - Hooklying Single Knee to Chest Stretch  - 3-5 x weekly - 3 sets - 10 seconds hold  - Supine Pelvic Floor Stretch  - 3-5 x weekly - 3 sets - 5 breaths hold  - Supine Gluteus Stretch  - 3-5 x weekly - 3 sets - 20 seconds hold  - Supine Figure 4 Piriformis Stretch  - 3-5 x weekly - 3 sets - 20 seconds hold  - Supine Hamstring Stretch with Strap  - 3-5 x weekly - 3 sets - 20 seconds hold  - Supine Butterfly Groin Stretch  - 3-5 x weekly - 3 sets - 20 seconds hold  - Sidelying Thoracic Rotation with Open Book  - 3-5 x weekly - 5 sets - 10 seconds hold  - Cat Cow  - 3-5 x  weekly - 10 reps  - Child's Pose Stretch  - 3-5 x weekly - 1-3 minutes hold  - Cobra  - 3-5 x weekly - 5-10 reps  - Prone Quadriceps Stretch with Strap  - 3-5 x weekly - 3 sets - 20 seconds hold  - Half Kneeling Hip Flexor Stretch  - 3-5 x weekly - 3 sets - 20 seconds hold    Plan for Next Visit: assess response to dilator program  - hip/core/PF stretching program continued  - diaphragmatic breathing    Assessment: Patient presents with signs and symptoms consistent with high tone pelvic floor, dyspareunia, muscle tightness, resulting in limited participation in pain-free ADLs and inability to perform at their prior level of function. Pt would benefit from physical therapy to address the impairments found & listed previously in the objective section in order to return to safe and pain-free ADLs and prior level of function.  - continued symptoms of central sensitization (up-regulated nervous system), pain, and decreased tolerance of dilators  - recommend holding dilators and starting a external vulvar desensitization program    Plan:   Planned Interventions include: therapeutic exercise, self-care home management, manual therapy, therapeutic activities, gait training, neuromuscular coordination, aquatic therapy  Patient and/or family understands and agrees with goals and plan documented: yes  Frequency: 2x/month  Duration: 2-4 months     Kanika Mckeon, PT

## 2025-02-04 ENCOUNTER — APPOINTMENT (OUTPATIENT)
Dept: PHYSICAL THERAPY | Facility: CLINIC | Age: 35
End: 2025-02-04
Payer: COMMERCIAL

## 2025-02-18 ENCOUNTER — TELEMEDICINE CLINICAL SUPPORT (OUTPATIENT)
Dept: PHYSICAL THERAPY | Facility: CLINIC | Age: 35
End: 2025-02-18
Payer: COMMERCIAL

## 2025-02-18 DIAGNOSIS — M62.89 HIGH-TONE PELVIC FLOOR DYSFUNCTION: Primary | ICD-10-CM

## 2025-02-18 DIAGNOSIS — N90.89 VULVAR ADHESIONS: ICD-10-CM

## 2025-02-18 PROCEDURE — 97535 SELF CARE MNGMENT TRAINING: CPT | Mod: GP | Performed by: PHYSICAL THERAPIST

## 2025-02-18 NOTE — PROGRESS NOTES
Physical Therapy  Physical Therapy Pelvic Floor  Virtual Visit    Name: Lavonne Booth  MRN: [id number]  : 1990  Today's Date: 25     Time Calculation  Start Time: 1330  Stop Time: 1400  Time Calculation (min): 30 min    Insurance: MMO, PT+OT  Visit # 1 of 40 for  (5 used in )    Current Problem:  1. High-tone pelvic floor dysfunction        2. Vulvar adhesions            General     Precautions     Vital Signs     Pain       Subjective  Chief Complaint:   - PP: 24  - grade 2 tearing with stitches  - tightness of PF and pain with sex, even before pregnancy  - using a topical estrogen cream for adhesions around stitches  - sometimes bilateral groin area with lifting leg a certain way/movements   - no pain with tampon use - dislikes paps and speculum due to discomfort  - no BC and no menstruating (irregular)  - L5 stress fx, 10 years ago & HS - back brace  Onset: years  SHREYA: unknown    10/22/24:   - used 2nd size dilator a couple of times with cramping sensation felt during, but not after  - was told she has adhesions and feels most pain at 6 o'clock  - hep compliance: yes, happy baby, hamstring stretch    12/3/24:   - Went back to smallest dilator - cramping and nausea (takes 5 minutes for symptoms to go away)  - was able to progress to 2nd size dilator with initial stretching of size 1 dilator  - anticipates pain and senses tension prior to and during use, sometimes cramping during use, then will calm with prolonged exposure/hold and then will feel cramping when she is done  - reports increased sensitivity of her nipples as well that initially made feeding and pumping difficult, was prescribed an antidepressant that decreased nipple pain    24:   - Desensitization went well, no pain or problems with external palpation of vulva  - has not re-attempted dilators at this time, expressed anxiety in anticipation of vaginal penetration  - attempted penetrative intercourse, was painful the entire  time, almost had to stop    PAIN  Intensity (0-10): 0  Location:   Description:     Prior Level of Function (PLOF)  Exercise/Physical Activity: class, lifting and cardio (75%, 120# dead lifting) - HIIT - previously  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 Yes, Frequency? 75% of the time, Marinoff Scale? 3  BLADDER: symptoms of tight pelvic floor    Objective  Posture: forward head, rounded shoulders  SL Stance: wnl  DL Squat: PPT  SL Squat: dkv and hip drop  Standing Lumbar Mobility: moderate tightness all directions  SLR: wnl  Bridge: mild hip drop with march  Hip PROM: moderate tightness bilaterally  MMT: sup hip: 5/5  - Hamstrings: moderate tightness  - Piriformis: moderate tightness  Diastasis Recti: negative  Sven + Head Lift: functional closure    Treatments:  Discussed Vagus Nerve Stimulation, suggestions:   - breath work (47-8 breathing, box breathing, diaphragmatic breathing, alternating nose breathing)  - Guided Imagery ([facility])  - Meditation  - Progressive Relaxation    Discussed can do slow trial of exposure to smallest dilator again and monitor symptoms    Recommend Follow-Up with  Female Sexual Medicine Team:   - Charles Ramirez, CNP for continued pain with intercourse and pelvic floor dilator program  - Alissa Castro, PhD, & team for mental health/CBT support given anxiety surrounding painful intercourse    Access Code: [email or web address] - STRETCHING  - Supine Diaphragmatic Breathing  - 1-3 x daily - 3-5 reps  - Lying Prone  - 1 x daily - 5-10 minutes hold  - Supine Lower Trunk Rotation  - 3-5 x weekly - 10-30 reps  - Hooklying Single Knee to Chest Stretch  - 3-5 x weekly - 3 sets - 10 seconds hold  - Supine Pelvic Floor Stretch  - 3-5 x weekly - 3 sets - 5 breaths hold  - Supine Gluteus Stretch  - 3-5 x  weekly - 3 sets - 20 seconds hold  - Supine Figure 4 Piriformis Stretch  - 3-5 x weekly - 3 sets - 20 seconds hold  - Supine Hamstring Stretch with Strap  - 3-5 x weekly - 3 sets - 20 seconds hold  - Supine Butterfly Groin Stretch  - 3-5 x weekly - 3 sets - 20 seconds hold  - Sidelying Thoracic Rotation with Open Book  - 3-5 x weekly - 5 sets - 10 seconds hold  - Cat Cow  - 3-5 x weekly - 10 reps  - Child's Pose Stretch  - 3-5 x weekly - 1-3 minutes hold  - Cobra  - 3-5 x weekly - 5-10 reps  - Prone Quadriceps Stretch with Strap  - 3-5 x weekly - 3 sets - 20 seconds hold  - Half Kneeling Hip Flexor Stretch  - 3-5 x weekly - 3 sets - 20 seconds hold    Plan for Next Visit: assess response to dilator program  - hip/core/PF stretching program continued  - diaphragmatic breathing    Assessment: Patient presents with signs and symptoms consistent with high tone pelvic floor, dyspareunia, muscle tightness, resulting in limited participation in pain-free ADLs and inability to perform at their prior level of function. Pt would benefit from physical therapy to address the impairments found & listed previously in the objective section in order to return to safe and pain-free ADLs and prior level of function.  - Recommend Vagus nerve stimulation and follow-up with  Female Sexual Health team for further examination, diagnosis and treatment benefit    Plan:   Planned Interventions include: therapeutic exercise, self-care home management, manual therapy, therapeutic activities, gait training, neuromuscular coordination, aquatic therapy  Patient and/or family understands and agrees with goals and plan documented: yes  Frequency: 2x/month  Duration: 2-4 months     Kanika Mckeon, PT

## 2025-03-06 ENCOUNTER — TELEMEDICINE (OUTPATIENT)
Dept: BEHAVIORAL HEALTH | Facility: CLINIC | Age: 35
End: 2025-03-06
Payer: COMMERCIAL

## 2025-03-06 DIAGNOSIS — F41.8 POSTPARTUM ANXIETY: Primary | ICD-10-CM

## 2025-03-06 RX ORDER — NORTRIPTYLINE HYDROCHLORIDE 25 MG/1
CAPSULE ORAL
Qty: 7 CAPSULE | Refills: 0 | Status: SHIPPED | OUTPATIENT
Start: 2025-03-06

## 2025-03-06 RX ORDER — NORTRIPTYLINE HYDROCHLORIDE 50 MG/1
CAPSULE ORAL
Qty: 30 CAPSULE | Refills: 2 | Status: SHIPPED | OUTPATIENT
Start: 2025-03-06

## 2025-03-06 NOTE — PROGRESS NOTES
"HISTORY OF PRESENT ILLNESS:  Lavonne Booth is a 34 y.o. female with no past psychiatric history and no PMH presenting for post-partum anxiety after delivering baby Wagram 5/30/24, here for a 3 month follow-up on nortriptyline 50mg daily.    Pt reports that overall she has been doing a lot better, but did run out of her meds around a month ago. States that the medication however was extremely helpful, and she is interested in resuming again.     Mood - \"good\", a lot better, less anxiety \"still there but not crippling like it was before\"  SI - denies  Sleep - much better, but sometimes still wakes up in middle of the night and feels an urge to check on the baby   Appetite - increased as desired  Med side effects - dry mouth     PSYCHIATRIC HISTORY  Denies prior dx, hospitalizations, suicide attempts, or   Family psychiatric history: mom with anxiety    SUBSTANCE USE HISTORY   She reports that she has quit smoking. She has never used smokeless tobacco. She reports current alcohol use of about 1.0 standard drink of alcohol per week. She reports that she does not use drugs.    Tobacco: none recently, used to vape   Alcohol: 2 beers/week   Denies other drug use     SOCIAL HISTORY  Social History     Socioeconomic History    Marital status:    Tobacco Use    Smoking status: Former    Smokeless tobacco: Never    Tobacco comments:     Pt used vape before pregnancy    Vaping Use    Vaping status: Former    Substances: Nicotine    Devices: Disposable   Substance and Sexual Activity    Alcohol use: Yes     Alcohol/week: 1.0 standard drink of alcohol     Types: 1 Glasses of wine per week     Comment: occasional    Drug use: Never    Sexual activity: Yes     Partners: Male     Social Drivers of Health     Financial Resource Strain: Low Risk  (5/28/2024)    Overall Financial Resource Strain (CARDIA)     Difficulty of Paying Living Expenses: Not hard at all   Food Insecurity: No Food Insecurity (7/9/2024)    Hunger Vital " Sign     Worried About Running Out of Food in the Last Year: Never true     Ran Out of Food in the Last Year: Never true   Transportation Needs: No Transportation Needs (5/28/2024)    PRAPARE - Transportation     Lack of Transportation (Medical): No     Lack of Transportation (Non-Medical): No   Stress: Stress Concern Present (7/9/2024)    [facility] - Occupational Stress Questionnaire     Feeling of Stress : To some extent   Intimate Partner Violence: Not At Risk (7/9/2024)    Humiliation, Afraid, Rape, and Kick questionnaire     Fear of Current or Ex-Partner: No     Emotionally Abused: No     Physically Abused: No     Sexually Abused: No      Current living situation: lives with    Current employment/source of income: recent delivery     Born and raised: [place]   Family: no family around to help   Employment: works as physical therapist at , full time   Legal history: physical control (was sleeping in her car after a night of drinking)  Access to weapons:  owns guns (not locked but in gun case)    PAST MEDICAL HISTORY  Past Medical History:   Diagnosis Date    Dizziness 11/10/2023    Dysmenorrhea 07/07/2021    Fibrocystic breast 07/07/2021    Need for immunization against influenza 11/10/2023    S/p flu 11/10/023    PMS (premenstrual syndrome) 07/07/2021        Prior Head trauma/TBI/LOC/seizure history: none  Ob/Gyn history: one pregnancy, vaginal delivery   LMP/contraception: not sexually active right now     PAST SURGICAL HISTORY  Past Surgical History:   Procedure Laterality Date    EYE SURGERY            FAMILY HISTORY  Family History   Problem Relation Name Age of Onset    Cervical cancer Mother      Breast cancer Mother's Sister      Breast cancer Mother's Sister        ALLERGIES  Azithromycin and Definity [perflutren lipid microspheres]    Some components of the patient's history were obtained through personal review of the patient's available medical  "records.    OARRS REVIEW  OARRS checked: no activity     OBJECTIVE    VITALS      6/2/2024     9:14 PM 6/3/2024     1:07 AM 6/3/2024     6:12 AM 6/3/2024     7:42 AM 6/3/2024     9:00 AM 6/3/2024    11:03 AM 7/9/2024    11:14 AM   Vitals   Systolic 133 125 128 143 124 127 127   Diastolic 83 81 79 83 80 75 80   Heart Rate 56 66 60 63 60 62    Temp 37 °C (98.6 °F) 36.8 °C (98.2 °F) 36.3 °C (97.3 °F) 36.4 °C (97.5 °F) 36.6 °C (97.9 °F) 36.7 °C (98.1 °F)    Resp 16 18 18 16 16 16    Height       1.702 m (5' 7\")   Weight (lb)       165   BMI       25.84 kg/m2   BSA (m2)       1.88 m2        MENTAL STATUS EXAM  Appearance: Sitting in car for virtual appt  Attitude: Pleasant, cooperative  Behavior: Appropriate eye contact  Motor Activity: No abnormal movements  Speech: Spontaneous, regular rate/rhythm/volume  Mood: \"Much better!\"  Affect: Euthymic, mood-congruent, stable  Thought Process: Linear and goal-oriented  Thought Content:  Denies suicidal or violent ideation   Thought Perception: Doesn't appear internally stimulated   Cognition: Grossly intact  Insight: Fair  Judgement: Fair    HOME MEDICATIONS  Medication Documentation Review Audit       Reviewed by Serenity Hammond MD (Resident) on 03/06/25 at 1637      Medication Order Taking? Sig Documenting Provider Last Dose Status   acetaminophen (Tylenol) 325 mg tablet [id number]  Take 3 tablets (975 mg) by mouth every 6 hours. Airam Gonzalez PA-C  Active   cyclobenzaprine (Flexeril) 10 mg tablet [id number]  Take 1 tablet (10 mg) by mouth 3 times a day as needed for muscle spasms. Jennifer Gee MD  Active   estradiol (Estrace) 0.01 % (0.1 mg/gram) vaginal cream [id number]  Apply a pea-sized amount at the vaginal opening nightly for 2 weeks then 2-4 times weekly, adjusting as needed to effect.  You do not need to use the applicator as this will provide more medication than needed. Elli Villar MD  Active   furosemide (Lasix) 20 mg tablet [id number]  Take 1 tablet (20 mg) by " mouth once daily for 4 days. Luly Rodriguez, APRN-CNP   24 2359   ibuprofen 600 mg tablet [id number]  Take 1 tablet (600 mg) by mouth every 6 hours. Airam Gonzalez PA-C  Active   mupirocin (Bactroban) 2 % cream [id number] No Apply topically 3 times a day. Elli Villar MD 3/21/2024  25 2359   nortriptyline (Pamelor) 50 mg capsule [id number]  Take 1 capsule (50 mg) by mouth once daily at bedtime. Latonya Wright MD  Active                     CURRENT MEDICATIONS  Nortriptyline 20 mg at bedtime     LABS  No results found for this or any previous visit (from the past 24 hours).     IMAGING  No results found.     PSYCHIATRIC RISK ASSESSMENT  Violence Risk Factors:  stress/destabilizers  Acute Risk of Harm to Others is Considered: Low  Suicide Risk Factors: history of trauma or abuse, current psychiatric illness, and access to weapons  Protective Factors: sense of responsibility towards family, social support/connectedness, and child-related concerns/living with child < 18 yrs age  Acute Risk of Harm to Self is Considered: Low    ASSESSMENT AND PLAN  Lavonne Booth is a 34 y.o. female with no past psychiatric history and no PMH presenting for post-partum anxiety after delivering baby Maryanne 24, here for a 3 month follow-up on nortriptyline 50mg daily.    On 24 initial interview, she reports that her delivery was traumatic and she feels like she's remained very anxious ever since, although she doesn't feel like she had any prior problems with anxiety. Anxious worries about her baby not breathing, for example, keep her up, and she's sleeping only 4 hours a night on average. She denies any panic attacks but endorses many sx of depression, including anhedonia, low energy, irritability, low appetite, and crying every day. She's breastfeeding and taking nortriptyline 20 mg at bedtime for nipple pain, which has been effective. When we discussed the options to either increase nortriptyline or start  an SSRI, Lavonne opted to increase the dose of nortriptyline.     3/06 - Doing much better with improved mood and dec anxiety. Does enjoy nortriptyline and can tolerate only side effect of dry mouth. Sleep/bk/energy all improved. Will up-titrate her back to nortriptyline 50mg as she did run out one month ago.     IMPRESSION  #Post-partum anxiety  #Post-partum depression     RECOMMENDATIONS  -Re-start nortriptyline (25mg for one week then inc to 50mg daily) for mood and anxiety sxs  -RTC in 4-6 weeks    Patient discussed with Dr. Dumas, who agrees with above plan.    Serenity Hammond MD

## 2025-03-20 ENCOUNTER — ANCILLARY PROCEDURE (OUTPATIENT)
Dept: URGENT CARE | Age: 35
End: 2025-03-20
Payer: COMMERCIAL

## 2025-03-20 ENCOUNTER — OFFICE VISIT (OUTPATIENT)
Dept: URGENT CARE | Age: 35
End: 2025-03-20
Payer: COMMERCIAL

## 2025-03-20 VITALS
RESPIRATION RATE: 18 BRPM | HEART RATE: 70 BPM | TEMPERATURE: 97.1 F | OXYGEN SATURATION: 98 % | DIASTOLIC BLOOD PRESSURE: 70 MMHG | SYSTOLIC BLOOD PRESSURE: 132 MMHG

## 2025-03-20 DIAGNOSIS — J20.9 ACUTE BRONCHITIS, UNSPECIFIED ORGANISM: Primary | ICD-10-CM

## 2025-03-20 DIAGNOSIS — R05.1 ACUTE COUGH: ICD-10-CM

## 2025-03-20 PROCEDURE — 71046 X-RAY EXAM CHEST 2 VIEWS: CPT | Performed by: PHYSICIAN ASSISTANT

## 2025-03-20 RX ORDER — ALBUTEROL SULFATE 90 UG/1
2 INHALANT RESPIRATORY (INHALATION) EVERY 6 HOURS PRN
Qty: 18 G | Refills: 0 | Status: SHIPPED | OUTPATIENT
Start: 2025-03-20 | End: 2026-03-20

## 2025-03-20 RX ORDER — PREDNISONE 20 MG/1
TABLET ORAL
Qty: 10 TABLET | Refills: 0 | Status: SHIPPED | OUTPATIENT
Start: 2025-03-20

## 2025-03-20 RX ORDER — BENZONATATE 100 MG/1
100 CAPSULE ORAL 3 TIMES DAILY PRN
Qty: 20 CAPSULE | Refills: 0 | Status: SHIPPED | OUTPATIENT
Start: 2025-03-20 | End: 2025-03-27

## 2025-03-20 ASSESSMENT — ENCOUNTER SYMPTOMS: COUGH: 1

## 2025-03-20 NOTE — PROGRESS NOTES
Subjective   Patient ID: Lavonne Booth is a 34 y.o. female. They present today with a chief complaint of Cough (Congestion / cough x 2 weeks ).    History of Present Illness  Patient is a very pleasant 84-year-old white female, no significant past medical history, presented to clinic for chief complaint of cough.  Patient is reporting 2-week history of persistent dry spasmatic cough that has been lingering after what she feels was a cold about 2 weeks ago.  States all of her other symptoms have resolved.  She does report intermittent clear sputum production.  No chest pain or shortness of breath.  She denies any upper respiratory congestion rhinorrhea or postnasal drainage.  No ear pain.  No sore throat.  No abdominal pain, nausea, vomiting.  No numbness tingling or focal weakness.  No further complaints.      Cough        Past Medical History  Allergies as of 03/20/2025 - Reviewed 03/20/2025   Allergen Reaction Noted    Azithromycin Hives 06/07/2012    Definity [perflutren lipid microspheres] Other 06/03/2024       (Not in a hospital admission)         Past Medical History:   Diagnosis Date    Dizziness 11/10/2023    Dysmenorrhea 07/07/2021    Fibrocystic breast 07/07/2021    Need for immunization against influenza 11/10/2023    S/p flu 11/10/023    PMS (premenstrual syndrome) 07/07/2021       Past Surgical History:   Procedure Laterality Date    EYE SURGERY          reports that she has quit smoking. She has never used smokeless tobacco. She reports current alcohol use of about 1.0 standard drink of alcohol per week. She reports that she does not use drugs.    Review of Systems  Review of Systems   Respiratory:  Positive for cough.                                   Objective    Vitals:    03/20/25 1753   BP: 132/70   Pulse: 70   Resp: 18   Temp: 36.2 °C (97.1 °F)   SpO2: 98%     No LMP recorded (lmp unknown).    Physical Exam  General: Vitals Noted. No distress. Normocephalic.     HEENT: TMs normal, EOMI, normal  conjunctiva, patent nares with erythematous edematous and appear nasal turbinates and clear rhinorrhea bilaterally.  Posterior oropharynx with signs of postnasal drainage without any erythema swelling or tonsillar exudate.  Uvula is in the midline and nonedematous.  No drooling.  No trismus.    Neck: Supple with no adenopathy.     Cardiac: Regular Rate and Rhythm. No murmur.     Pulmonary: Equal breath sounds bilaterally with scattered faint end expiratory wheezing throughout all lung fields.  There is no accessory muscle use or stridor.  No signs of acute distress.    Abdomen: Soft, non-tender, with normal bowel sounds.     Musculoskeletal: Moves all extremities, no effusion, no edema.     Skin: No obvious rashes.  Procedures    Point of Care Test & Imaging Results from this visit    XR chest 2 views    Result Date: 3/20/2025  Interpreted By:  Som East, STUDY: XR CHEST 2 VIEWS;  3/20/2025 6:07 pm   INDICATION: Signs/Symptoms:cough x 2 weeks.   ,R05.1 Acute cough   COMPARISON: Radiographs of the chest dated 06/01/2024.   ACCESSION NUMBER(S): [id number]   ORDERING CLINICIAN: CHAUNCEY ALEX   FINDINGS: PA and lateral radiographs of the chest were provided.       CARDIOMEDIASTINAL SILHOUETTE: Cardiomediastinal silhouette is normal in size and configuration.   LUNGS: Lungs are clear, without evidence of consolidation or sizable pleural effusion.   ABDOMEN: No remarkable upper abdominal findings.   BONES: No acute osseous changes.       1.  No evidence of acute cardiopulmonary process.       MACRO: None   Signed by: Som East 3/20/2025 6:11 PM Dictation workstation:   [id number]     Diagnostic study results (if any) were reviewed by Chauncey Alex PA-C.    Assessment/Plan   Allergies, medications, history, and pertinent labs/EKGs/Imaging reviewed by Chauncey Alex PA-C.     Medical Decision Making  Patient was seen evaluate clinic with complaint of cough x 2 weeks.  On exam  patient is nontoxic very well-appearing respect comfortably no acute distress.  Vital signs are stable, afebrile.  Heart is regular, belly is diffusely soft and nontender.  She does have scattered and expiratory wheezing throughout all lung fields bilaterally no associated accessory muscle use or stridor rales or rhonchi.  Two-view chest x-ray was obtained which reveals no underlying acute cardiopulmonary abnormalities.  Patient has an acute bronchitis with prednisone 40 mg daily x 5 days albuterol inhaler and provided Tessalon Perles to be as needed for cough.  Advised to follow-up with her primary care physician in the next week.  She will be discharged home at this time.  Reviewed my impression, plan, strict return was report to ED precautions with the patient.  She expresses understanding and agreement plan of care.    Orders and Diagnoses  Diagnoses and all orders for this visit:  Acute bronchitis, unspecified organism  -     predniSONE (Deltasone) 20 mg tablet; Take two tablets per day for 5 days  -     albuterol 90 mcg/actuation inhaler; Inhale 2 puffs every 6 hours if needed for wheezing.  -     benzonatate (Tessalon) 100 mg capsule; Take 1 capsule (100 mg) by mouth 3 times a day as needed for cough for up to 7 days. Do not crush or chew.  Acute cough  -     XR chest 2 views; Future        Medical Admin Record      Follow Up Instructions  No follow-ups on file.    Patient disposition: Home    Electronically signed by Chauncey Alex PA-C  6:18 PM

## 2025-04-03 ENCOUNTER — TELEMEDICINE (OUTPATIENT)
Dept: BEHAVIORAL HEALTH | Facility: CLINIC | Age: 35
End: 2025-04-03
Payer: COMMERCIAL

## 2025-04-03 DIAGNOSIS — F41.8 POSTPARTUM ANXIETY: Primary | ICD-10-CM

## 2025-04-03 NOTE — PROGRESS NOTES
"HISTORY OF PRESENT ILLNESS:  Lavonne Booth is a 34 y.o. female with no past psychiatric history and no PMH presenting for post-partum anxiety after delivering baby [place] 5/30/24, here for a routine follow-up on nortriptyline 50mg daily.    Pt reports that she has been doing well in the interim. Notably has been able to titrate back up to current dosing without adverse side effects outside of tolerable dry mouth. She denies any new/additional concerns stoday.    Mood - \"Pretty good\"  SI - Denies  Sleep - Good  Appetite - Good  Med side effects - Dry mouth but tolerable    PSYCHIATRIC HISTORY  Denies prior dx, hospitalizations, suicide attempts, or   Family psychiatric history: mom with anxiety    SUBSTANCE USE HISTORY   She reports that she has quit smoking. She has never used smokeless tobacco. She reports current alcohol use of about 1.0 standard drink of alcohol per week. She reports that she does not use drugs.    Tobacco: none recently, used to vape   Alcohol: 2 beers/week   Denies other drug use     SOCIAL HISTORY  Social History     Socioeconomic History    Marital status:    Tobacco Use    Smoking status: Former    Smokeless tobacco: Never    Tobacco comments:     Pt used vape before pregnancy    Vaping Use    Vaping status: Former    Substances: Nicotine    Devices: Disposable   Substance and Sexual Activity    Alcohol use: Yes     Alcohol/week: 1.0 standard drink of alcohol     Types: 1 Glasses of wine per week     Comment: occasional    Drug use: Never    Sexual activity: Yes     Partners: Male     Social Drivers of Health     Financial Resource Strain: Low Risk  (5/28/2024)    Overall Financial Resource Strain (CARDIA)     Difficulty of Paying Living Expenses: Not hard at all   Food Insecurity: No Food Insecurity (7/9/2024)    Hunger Vital Sign     Worried About Running Out of Food in the Last Year: Never true     Ran Out of Food in the Last Year: Never true   Transportation Needs: No " Transportation Needs (5/28/2024)    PRAPARE - Transportation     Lack of Transportation (Medical): No     Lack of Transportation (Non-Medical): No   Stress: Stress Concern Present (7/9/2024)    [employer] - Occupational Stress Questionnaire     Feeling of Stress : To some extent   Intimate Partner Violence: Not At Risk (7/9/2024)    Humiliation, Afraid, Rape, and Kick questionnaire     Fear of Current or Ex-Partner: No     Emotionally Abused: No     Physically Abused: No     Sexually Abused: No      Current living situation: lives with    Current employment/source of income: recent delivery     Born and raised: [place]   Family: no family around to help   Employment: works as physical therapist at , full time   Legal history: physical control (was sleeping in her car after a night of drinking)  Access to weapons:  owns guns (not locked but in gun case)    PAST MEDICAL HISTORY  Past Medical History:   Diagnosis Date    Dizziness 11/10/2023    Dysmenorrhea 07/07/2021    Fibrocystic breast 07/07/2021    Need for immunization against influenza 11/10/2023    S/p flu 11/10/023    PMS (premenstrual syndrome) 07/07/2021        Prior Head trauma/TBI/LOC/seizure history: none  Ob/Gyn history: one pregnancy, vaginal delivery   LMP/contraception: not sexually active right now     PAST SURGICAL HISTORY  Past Surgical History:   Procedure Laterality Date    EYE SURGERY            FAMILY HISTORY  Family History   Problem Relation Name Age of Onset    Cervical cancer Mother      Breast cancer Mother's Sister      Breast cancer Mother's Sister        ALLERGIES  Azithromycin and Definity [perflutren lipid microspheres]    Some components of the patient's history were obtained through personal review of the patient's available medical records.    OARRS REVIEW  OARRS checked: no activity     OBJECTIVE    VITALS      6/3/2024     1:07 AM 6/3/2024     6:12 AM 6/3/2024     7:42 AM 6/3/2024  "    9:00 AM 6/3/2024    11:03 AM 2024    11:14 AM 3/20/2025     5:53 PM   Vitals   Systolic 125 128 143 124 127 127 132   Diastolic 81 79 83 80 75 80 70   Heart Rate 66 60 63 60 62  70   Temp 36.8 °C (98.2 °F) 36.3 °C (97.3 °F) 36.4 °C (97.5 °F) 36.6 °C (97.9 °F) 36.7 °C (98.1 °F)  36.2 °C (97.1 °F)   Resp 18 18 16 16 16  18   Height      1.702 m (5' 7\")    Weight (lb)      165    BMI      25.84 kg/m2    BSA (m2)      1.88 m2    Visit Report       Report        MENTAL STATUS EXAM  Appearance: Limited by virtual visit  Attitude: Pleasant, cooperative  Behavior: Limited by virtual visit  Motor Activity: No abnormal movements  Speech: Spontaneous, regular rate/rhythm/volume  Mood: \"It's been really good\"  Affect: Euthymic, mood-congruent, stable  Thought Process: Linear and goal-oriented  Thought Content:  Denies suicidal or violent ideation   Thought Perception: Doesn't appear internally stimulated   Cognition: Grossly intact  Insight: Fair  Judgement: Fair    HOME MEDICATIONS  Medication Documentation Review Audit       Reviewed by Serenity Hammond MD (Resident) on 25 at 1741      Medication Order Taking? Sig Documenting Provider Last Dose Status   acetaminophen (Tylenol) 325 mg tablet [id number]  Take 3 tablets (975 mg) by mouth every 6 hours. Airam Gonzalez PA-C  Active   albuterol 90 mcg/actuation inhaler [id number]  Inhale 2 puffs every 6 hours if needed for wheezing. hCauncey Alex PA-C  Active   benzonatate (Tessalon) 100 mg capsule [id number]  Take 1 capsule (100 mg) by mouth 3 times a day as needed for cough for up to 7 days. Do not crush or chew. Chauncey Alex PA-C   25 [id number]   cyclobenzaprine (Flexeril) 10 mg tablet [id number]  Take 1 tablet (10 mg) by mouth 3 times a day as needed for muscle spasms. Jennifer Gee MD  Active   estradiol (Estrace) 0.01 % (0.1 mg/gram) vaginal cream [id number]  Apply a pea-sized amount at the vaginal opening nightly for 2 weeks then 2-4 times " weekly, adjusting as needed to effect.  You do not need to use the applicator as this will provide more medication than needed. Elli Villar MD  Active   furosemide (Lasix) 20 mg tablet [id number]  Take 1 tablet (20 mg) by mouth once daily for 4 days. Luly Rodriguez, APRN-CNP   24 [id number]   ibuprofen 600 mg tablet [id number]  Take 1 tablet (600 mg) by mouth every 6 hours. Airam Gonzalez PA-C  Active   nortriptyline (Pamelor) 25 mg capsule [id number]  Take 25mg once daily for one week, then change to 50mg capsules once daily thereafter. Serenity Hammond MD  Active   nortriptyline (Pamelor) 50 mg capsule [id number]  Take 25mg once daily for one week then increase to 50mg once daily thereafter. Serenity Hammond MD  Active   predniSONE (Deltasone) 20 mg tablet [id number]  Take two tablets per day for 5 days Chauncey Alex PA-C  Active                     CURRENT MEDICATIONS  Nortriptyline 20 mg at bedtime     LABS  No results found for this or any previous visit (from the past 24 hours).     IMAGING  No results found.     PSYCHIATRIC RISK ASSESSMENT  Violence Risk Factors:  stress/destabilizers  Acute Risk of Harm to Others is Considered: Low  Suicide Risk Factors: history of trauma or abuse, current psychiatric illness, and access to weapons  Protective Factors: sense of responsibility towards family, social support/connectedness, and child-related concerns/living with child < 18 yrs age  Acute Risk of Harm to Self is Considered: Low    ASSESSMENT AND PLAN  Lavonne Booth is a 34 y.o. female with no past psychiatric history and no PMH presenting for post-partum anxiety after delivering baby [place] 24, here for a routine follow-up on nortriptyline 50mg daily.    On 24 initial interview, she reports that her delivery was traumatic and she feels like she's remained very anxious ever since, although she doesn't feel like she had any prior problems with anxiety. Anxious worries about her baby not breathing, for  example, keep her up, and she's sleeping only 4 hours a night on average. She denies any panic attacks but endorses many sx of depression, including anhedonia, low energy, irritability, low appetite, and crying every day. She's breastfeeding and taking nortriptyline 20 mg at bedtime for nipple pain, which has been effective. When we discussed the options to either increase nortriptyline or start an SSRI, Lavonne opted to increase the dose of nortriptyline.     3/06 - Doing much better with improved mood and dec anxiety. Does enjoy nortriptyline and can tolerate only side effect of dry mouth. Sleep/bk/energy all improved. Will up-titrate her back to nortriptyline 50mg as she did run out one month ago.     4/03 - Continuing to do well with stable mood and anxiety well-controlled. Tolerating medication, will plan to continue current management.     IMPRESSION  #Post-partum anxiety  #Post-partum depression     RECOMMENDATIONS  -Continue nortriptyline 50mg PO daily for mood and anxiety sxs   -Pt reportedly taking at nighttime  -RTC in 3 months or sooner as needed    Patient discussed with Dr. Dumas, who agrees with above plan.    Serenity Hammond MD

## 2025-08-22 ENCOUNTER — OFFICE VISIT (OUTPATIENT)
Dept: URGENT CARE | Age: 35
End: 2025-08-22
Payer: COMMERCIAL

## 2025-08-22 VITALS
RESPIRATION RATE: 19 BRPM | TEMPERATURE: 98 F | DIASTOLIC BLOOD PRESSURE: 87 MMHG | HEART RATE: 70 BPM | SYSTOLIC BLOOD PRESSURE: 129 MMHG | OXYGEN SATURATION: 98 %

## 2025-08-22 DIAGNOSIS — B00.1 COLD SORE: Primary | ICD-10-CM

## 2025-08-22 RX ORDER — VALACYCLOVIR HYDROCHLORIDE 1 G/1
2000 TABLET, FILM COATED ORAL 2 TIMES DAILY
Qty: 4 TABLET | Refills: 0 | Status: SHIPPED | OUTPATIENT
Start: 2025-08-22 | End: 2025-08-23